# Patient Record
Sex: MALE | Race: BLACK OR AFRICAN AMERICAN | NOT HISPANIC OR LATINO | URBAN - METROPOLITAN AREA
[De-identification: names, ages, dates, MRNs, and addresses within clinical notes are randomized per-mention and may not be internally consistent; named-entity substitution may affect disease eponyms.]

---

## 2017-01-13 ENCOUNTER — EMERGENCY (EMERGENCY)
Facility: HOSPITAL | Age: 52
LOS: 1 days | Discharge: HOME | End: 2017-01-13
Attending: EMERGENCY MEDICINE | Admitting: EMERGENCY MEDICINE
Payer: MEDICAID

## 2017-01-13 VITALS
TEMPERATURE: 98 F | RESPIRATION RATE: 17 BRPM | HEART RATE: 76 BPM | OXYGEN SATURATION: 98 % | DIASTOLIC BLOOD PRESSURE: 79 MMHG | SYSTOLIC BLOOD PRESSURE: 153 MMHG

## 2017-01-13 VITALS
TEMPERATURE: 98 F | SYSTOLIC BLOOD PRESSURE: 163 MMHG | HEART RATE: 89 BPM | OXYGEN SATURATION: 96 % | RESPIRATION RATE: 18 BRPM | DIASTOLIC BLOOD PRESSURE: 83 MMHG
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
## 2017-01-13 DIAGNOSIS — S86.812A STRAIN OF OTHER MUSCLE(S) AND TENDON(S) AT LOWER LEG LEVEL, LEFT LEG, INITIAL ENCOUNTER: ICD-10-CM
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
## 2017-01-13 DIAGNOSIS — M79.662 PAIN IN LEFT LOWER LEG: ICD-10-CM

## 2017-01-13 PROCEDURE — 29515 APPLICATION SHORT LEG SPLINT: CPT | Mod: LT

## 2017-01-13 PROCEDURE — 99283 EMERGENCY DEPT VISIT LOW MDM: CPT | Mod: 25

## 2017-01-13 NOTE — ED PROCEDURE NOTE - CPROC ED POST PROC CARE GUIDE1
Keep the cast/splint/dressing clean and dry./Verbal/written post procedure instructions were given to patient/caregiver./Instructed patient/caregiver regarding signs and symptoms of infection./Elevate the injured extremity as instructed./Instructed patient/caregiver to follow-up with primary care physician.

## 2017-01-13 NOTE — ED ADULT TRIAGE NOTE - CHIEF COMPLAINT QUOTE
c/o left calf pain. Pt reports was holding a large cage and while preventing it from falling, left foot got caught and fell back quotes "it bent too far. Feels like a permanent charleyhorse." Noted stiffens to left calf.

## 2017-01-13 NOTE — ED PROVIDER NOTE - OBJECTIVE STATEMENT
pt to ed co pain and swelling after fall at work with severe pain to calf to lower left leg no dizzy no NVD no cough no SOB no fever no chills no deformity unable to weight bear

## 2017-01-13 NOTE — ED PROVIDER NOTE - MUSCULOSKELETAL, MLM
Spine appears normal, range of motion is not limited, tender with small deficit to calf achilles intact NVI FROM

## 2017-01-18 ENCOUNTER — APPOINTMENT (OUTPATIENT)
Dept: ORTHOPEDIC SURGERY | Facility: CLINIC | Age: 52
End: 2017-01-18

## 2017-01-18 DIAGNOSIS — F17.200 NICOTINE DEPENDENCE, UNSPECIFIED, UNCOMPLICATED: ICD-10-CM

## 2017-02-01 ENCOUNTER — APPOINTMENT (OUTPATIENT)
Dept: ORTHOPEDIC SURGERY | Facility: CLINIC | Age: 52
End: 2017-02-01

## 2017-02-01 DIAGNOSIS — M10.472 OTHER SECONDARY GOUT, LEFT ANKLE AND FOOT: ICD-10-CM

## 2017-02-06 ENCOUNTER — OTHER (OUTPATIENT)
Age: 52
End: 2017-02-06

## 2017-02-15 ENCOUNTER — APPOINTMENT (OUTPATIENT)
Dept: ORTHOPEDIC SURGERY | Facility: CLINIC | Age: 52
End: 2017-02-15

## 2017-02-15 RX ORDER — INDOMETHACIN 25 MG/1
25 CAPSULE ORAL 3 TIMES DAILY
Qty: 20 | Refills: 0 | Status: COMPLETED | COMMUNITY
Start: 2017-01-18 | End: 2017-02-01

## 2017-03-15 ENCOUNTER — APPOINTMENT (OUTPATIENT)
Dept: ORTHOPEDIC SURGERY | Facility: CLINIC | Age: 52
End: 2017-03-15

## 2022-11-07 ENCOUNTER — INPATIENT (INPATIENT)
Facility: HOSPITAL | Age: 57
LOS: 1 days | Discharge: HOME | DRG: 247 | End: 2022-11-09
Attending: INTERNAL MEDICINE | Admitting: INTERNAL MEDICINE
Payer: COMMERCIAL

## 2022-11-07 VITALS
RESPIRATION RATE: 18 BRPM | OXYGEN SATURATION: 94 % | SYSTOLIC BLOOD PRESSURE: 142 MMHG | HEART RATE: 110 BPM | TEMPERATURE: 98 F | DIASTOLIC BLOOD PRESSURE: 84 MMHG | WEIGHT: 245.82 LBS

## 2022-11-07 DIAGNOSIS — I10 ESSENTIAL (PRIMARY) HYPERTENSION: ICD-10-CM

## 2022-11-07 DIAGNOSIS — E11.9 TYPE 2 DIABETES MELLITUS WITHOUT COMPLICATIONS: ICD-10-CM

## 2022-11-07 DIAGNOSIS — I25.10 ATHEROSCLEROTIC HEART DISEASE OF NATIVE CORONARY ARTERY WITHOUT ANGINA PECTORIS: ICD-10-CM

## 2022-11-07 LAB
ALBUMIN SERPL ELPH-MCNC: 4.8 G/DL — SIGNIFICANT CHANGE UP (ref 3.3–5)
ALBUMIN SERPL ELPH-MCNC: 4.8 G/DL — SIGNIFICANT CHANGE UP (ref 3.3–5)
ALP SERPL-CCNC: 137 U/L — HIGH (ref 40–120)
ALP SERPL-CCNC: SIGNIFICANT CHANGE UP (ref 40–120)
ALT FLD-CCNC: 34 U/L — SIGNIFICANT CHANGE UP (ref 10–45)
ALT FLD-CCNC: SIGNIFICANT CHANGE UP (ref 10–45)
ANION GAP SERPL CALC-SCNC: 14 MMOL/L — SIGNIFICANT CHANGE UP (ref 5–17)
ANION GAP SERPL CALC-SCNC: 15 MMOL/L — SIGNIFICANT CHANGE UP (ref 5–17)
AST SERPL-CCNC: 42 U/L — HIGH (ref 10–40)
AST SERPL-CCNC: SIGNIFICANT CHANGE UP (ref 10–40)
BASE EXCESS BLDV CALC-SCNC: 2.7 MMOL/L — SIGNIFICANT CHANGE UP (ref -2–3)
BASOPHILS # BLD AUTO: 0.05 K/UL — SIGNIFICANT CHANGE UP (ref 0–0.2)
BASOPHILS NFR BLD AUTO: 0.9 % — SIGNIFICANT CHANGE UP (ref 0–2)
BILIRUB SERPL-MCNC: 0.3 MG/DL — SIGNIFICANT CHANGE UP (ref 0.2–1.2)
BILIRUB SERPL-MCNC: 0.4 MG/DL — SIGNIFICANT CHANGE UP (ref 0.2–1.2)
BUN SERPL-MCNC: 16 MG/DL — SIGNIFICANT CHANGE UP (ref 7–23)
BUN SERPL-MCNC: 17 MG/DL — SIGNIFICANT CHANGE UP (ref 7–23)
CALCIUM SERPL-MCNC: 10.4 MG/DL — SIGNIFICANT CHANGE UP (ref 8.4–10.5)
CALCIUM SERPL-MCNC: 10.5 MG/DL — SIGNIFICANT CHANGE UP (ref 8.4–10.5)
CHLORIDE SERPL-SCNC: 96 MMOL/L — SIGNIFICANT CHANGE UP (ref 96–108)
CHLORIDE SERPL-SCNC: 97 MMOL/L — SIGNIFICANT CHANGE UP (ref 96–108)
CK MB CFR SERPL CALC: 3 NG/ML — SIGNIFICANT CHANGE UP (ref 0–6.7)
CK SERPL-CCNC: SIGNIFICANT CHANGE UP (ref 30–200)
CO2 BLDV-SCNC: 29.3 MMOL/L — HIGH (ref 22–26)
CO2 SERPL-SCNC: 25 MMOL/L — SIGNIFICANT CHANGE UP (ref 22–31)
CO2 SERPL-SCNC: 27 MMOL/L — SIGNIFICANT CHANGE UP (ref 22–31)
CREAT SERPL-MCNC: 0.99 MG/DL — SIGNIFICANT CHANGE UP (ref 0.5–1.3)
CREAT SERPL-MCNC: 1.04 MG/DL — SIGNIFICANT CHANGE UP (ref 0.5–1.3)
DACRYOCYTES BLD QL SMEAR: SLIGHT — SIGNIFICANT CHANGE UP
EGFR: 85 ML/MIN/1.73M2 — SIGNIFICANT CHANGE UP
EGFR: 90 ML/MIN/1.73M2 — SIGNIFICANT CHANGE UP
EOSINOPHIL # BLD AUTO: 0.15 K/UL — SIGNIFICANT CHANGE UP (ref 0–0.5)
EOSINOPHIL NFR BLD AUTO: 2.8 % — SIGNIFICANT CHANGE UP (ref 0–6)
GAS PNL BLDV: SIGNIFICANT CHANGE UP
GLUCOSE SERPL-MCNC: 151 MG/DL — HIGH (ref 70–99)
GLUCOSE SERPL-MCNC: 166 MG/DL — HIGH (ref 70–99)
HCO3 BLDV-SCNC: 28 MMOL/L — SIGNIFICANT CHANGE UP (ref 22–29)
HCT VFR BLD CALC: 44.3 % — SIGNIFICANT CHANGE UP (ref 39–50)
HGB BLD-MCNC: 15.3 G/DL — SIGNIFICANT CHANGE UP (ref 13–17)
LACTATE SERPL-SCNC: 2.6 MMOL/L — HIGH (ref 0.5–2)
LACTATE SERPL-SCNC: 2.8 MMOL/L — HIGH (ref 0.5–2)
LYMPHOCYTES # BLD AUTO: 1.34 K/UL — SIGNIFICANT CHANGE UP (ref 1–3.3)
LYMPHOCYTES # BLD AUTO: 25.2 % — SIGNIFICANT CHANGE UP (ref 13–44)
MAGNESIUM SERPL-MCNC: 1.8 MG/DL — SIGNIFICANT CHANGE UP (ref 1.6–2.6)
MANUAL SMEAR VERIFICATION: SIGNIFICANT CHANGE UP
MCHC RBC-ENTMCNC: 29.1 PG — SIGNIFICANT CHANGE UP (ref 27–34)
MCHC RBC-ENTMCNC: 34.5 GM/DL — SIGNIFICANT CHANGE UP (ref 32–36)
MCV RBC AUTO: 84.2 FL — SIGNIFICANT CHANGE UP (ref 80–100)
MONOCYTES # BLD AUTO: 0.3 K/UL — SIGNIFICANT CHANGE UP (ref 0–0.9)
MONOCYTES NFR BLD AUTO: 5.6 % — SIGNIFICANT CHANGE UP (ref 2–14)
NEUTROPHILS # BLD AUTO: 3.27 K/UL — SIGNIFICANT CHANGE UP (ref 1.8–7.4)
NEUTROPHILS NFR BLD AUTO: 61.7 % — SIGNIFICANT CHANGE UP (ref 43–77)
NT-PROBNP SERPL-SCNC: 7 PG/ML — SIGNIFICANT CHANGE UP (ref 0–300)
PCO2 BLDV: 44 MMHG — SIGNIFICANT CHANGE UP (ref 42–55)
PH BLDV: 7.41 — SIGNIFICANT CHANGE UP (ref 7.32–7.43)
PLAT MORPH BLD: NORMAL — SIGNIFICANT CHANGE UP
PLATELET # BLD AUTO: 252 K/UL — SIGNIFICANT CHANGE UP (ref 150–400)
PO2 BLDV: 50 MMHG — HIGH (ref 25–45)
POIKILOCYTOSIS BLD QL AUTO: SLIGHT — SIGNIFICANT CHANGE UP
POLYCHROMASIA BLD QL SMEAR: SLIGHT — SIGNIFICANT CHANGE UP
POTASSIUM SERPL-MCNC: 4.3 MMOL/L — SIGNIFICANT CHANGE UP (ref 3.5–5.3)
POTASSIUM SERPL-MCNC: SIGNIFICANT CHANGE UP (ref 3.5–5.3)
POTASSIUM SERPL-SCNC: 4.3 MMOL/L — SIGNIFICANT CHANGE UP (ref 3.5–5.3)
POTASSIUM SERPL-SCNC: SIGNIFICANT CHANGE UP (ref 3.5–5.3)
PROT SERPL-MCNC: 8.4 G/DL — HIGH (ref 6–8.3)
PROT SERPL-MCNC: 8.4 G/DL — HIGH (ref 6–8.3)
RBC # BLD: 5.26 M/UL — SIGNIFICANT CHANGE UP (ref 4.2–5.8)
RBC # FLD: 14.1 % — SIGNIFICANT CHANGE UP (ref 10.3–14.5)
RBC BLD AUTO: ABNORMAL
SAO2 % BLDV: SIGNIFICANT CHANGE UP % (ref 67–88)
SARS-COV-2 RNA SPEC QL NAA+PROBE: NEGATIVE — SIGNIFICANT CHANGE UP
SMUDGE CELLS # BLD: PRESENT — SIGNIFICANT CHANGE UP
SODIUM SERPL-SCNC: 137 MMOL/L — SIGNIFICANT CHANGE UP (ref 135–145)
SODIUM SERPL-SCNC: 137 MMOL/L — SIGNIFICANT CHANGE UP (ref 135–145)
TROPONIN T SERPL-MCNC: 0.01 NG/ML — SIGNIFICANT CHANGE UP (ref 0–0.01)
TROPONIN T SERPL-MCNC: 0.01 NG/ML — SIGNIFICANT CHANGE UP (ref 0–0.01)
VARIANT LYMPHS # BLD: 3.8 % — SIGNIFICANT CHANGE UP (ref 0–6)
WBC # BLD: 5.3 K/UL — SIGNIFICANT CHANGE UP (ref 3.8–10.5)
WBC # FLD AUTO: 5.3 K/UL — SIGNIFICANT CHANGE UP (ref 3.8–10.5)

## 2022-11-07 PROCEDURE — G1004: CPT

## 2022-11-07 PROCEDURE — 99285 EMERGENCY DEPT VISIT HI MDM: CPT

## 2022-11-07 PROCEDURE — 75574 CT ANGIO HRT W/3D IMAGE: CPT | Mod: 26,ME

## 2022-11-07 RX ORDER — CLOPIDOGREL BISULFATE 75 MG/1
75 TABLET, FILM COATED ORAL DAILY
Refills: 0 | Status: DISCONTINUED | OUTPATIENT
Start: 2022-11-08 | End: 2022-11-09

## 2022-11-07 RX ORDER — LISINOPRIL 2.5 MG/1
20 TABLET ORAL DAILY
Refills: 0 | Status: DISCONTINUED | OUTPATIENT
Start: 2022-11-08 | End: 2022-11-09

## 2022-11-07 RX ORDER — DEXTROSE 50 % IN WATER 50 %
12.5 SYRINGE (ML) INTRAVENOUS ONCE
Refills: 0 | Status: DISCONTINUED | OUTPATIENT
Start: 2022-11-07 | End: 2022-11-09

## 2022-11-07 RX ORDER — SODIUM CHLORIDE 9 MG/ML
1000 INJECTION, SOLUTION INTRAVENOUS
Refills: 0 | Status: DISCONTINUED | OUTPATIENT
Start: 2022-11-07 | End: 2022-11-09

## 2022-11-07 RX ORDER — ASPIRIN/CALCIUM CARB/MAGNESIUM 324 MG
81 TABLET ORAL DAILY
Refills: 0 | Status: DISCONTINUED | OUTPATIENT
Start: 2022-11-08 | End: 2022-11-09

## 2022-11-07 RX ORDER — MAGNESIUM SULFATE 500 MG/ML
1 VIAL (ML) INJECTION ONCE
Refills: 0 | Status: COMPLETED | OUTPATIENT
Start: 2022-11-07 | End: 2022-11-07

## 2022-11-07 RX ORDER — GLUCAGON INJECTION, SOLUTION 0.5 MG/.1ML
1 INJECTION, SOLUTION SUBCUTANEOUS ONCE
Refills: 0 | Status: DISCONTINUED | OUTPATIENT
Start: 2022-11-07 | End: 2022-11-09

## 2022-11-07 RX ORDER — DEXTROSE 50 % IN WATER 50 %
15 SYRINGE (ML) INTRAVENOUS ONCE
Refills: 0 | Status: DISCONTINUED | OUTPATIENT
Start: 2022-11-07 | End: 2022-11-09

## 2022-11-07 RX ORDER — CLOPIDOGREL BISULFATE 75 MG/1
600 TABLET, FILM COATED ORAL ONCE
Refills: 0 | Status: COMPLETED | OUTPATIENT
Start: 2022-11-07 | End: 2022-11-07

## 2022-11-07 RX ORDER — SODIUM CHLORIDE 9 MG/ML
1000 INJECTION INTRAMUSCULAR; INTRAVENOUS; SUBCUTANEOUS
Refills: 0 | Status: COMPLETED | OUTPATIENT
Start: 2022-11-07 | End: 2022-11-08

## 2022-11-07 RX ORDER — METOPROLOL TARTRATE 50 MG
100 TABLET ORAL ONCE
Refills: 0 | Status: COMPLETED | OUTPATIENT
Start: 2022-11-07 | End: 2022-11-07

## 2022-11-07 RX ORDER — ALLOPURINOL 300 MG
200 TABLET ORAL DAILY
Refills: 0 | Status: DISCONTINUED | OUTPATIENT
Start: 2022-11-08 | End: 2022-11-09

## 2022-11-07 RX ORDER — DEXTROSE 50 % IN WATER 50 %
25 SYRINGE (ML) INTRAVENOUS ONCE
Refills: 0 | Status: DISCONTINUED | OUTPATIENT
Start: 2022-11-07 | End: 2022-11-09

## 2022-11-07 RX ORDER — ASPIRIN/CALCIUM CARB/MAGNESIUM 324 MG
162 TABLET ORAL ONCE
Refills: 0 | Status: COMPLETED | OUTPATIENT
Start: 2022-11-07 | End: 2022-11-07

## 2022-11-07 RX ORDER — INSULIN LISPRO 100/ML
VIAL (ML) SUBCUTANEOUS
Refills: 0 | Status: DISCONTINUED | OUTPATIENT
Start: 2022-11-07 | End: 2022-11-09

## 2022-11-07 RX ADMIN — CLOPIDOGREL BISULFATE 600 MILLIGRAM(S): 75 TABLET, FILM COATED ORAL at 20:49

## 2022-11-07 RX ADMIN — SODIUM CHLORIDE 75 MILLILITER(S): 9 INJECTION INTRAMUSCULAR; INTRAVENOUS; SUBCUTANEOUS at 23:59

## 2022-11-07 RX ADMIN — Medication 162 MILLIGRAM(S): at 20:49

## 2022-11-07 RX ADMIN — Medication 100 GRAM(S): at 20:49

## 2022-11-07 RX ADMIN — Medication 100 MILLIGRAM(S): at 16:07

## 2022-11-07 RX ADMIN — Medication 162 MILLIGRAM(S): at 15:10

## 2022-11-07 NOTE — H&P ADULT - NSHPPHYSICALEXAM_GEN_ALL_CORE
Vital Signs Last 24 Hrs  afebrile   HR: 99 (07 Nov 2022 15:42) (99 - 110)  BP: 144/83 (07 Nov 2022 15:42) (142/84 - 144/83)  RR: 18 (07 Nov 2022 15:42) (18 - 18)  SpO2: 95%  room air      Neck: no JVD  Chest: CTA b/l  Cor: S1 S2 RRR, no murmurs  Abd: obese, soft, NT/ND  Ext: no edema b/l  VAsc: radial 2 + b/l , DP/PT 2+ b/l   Neuro: A+O x 3, non focal  Psych: anxious, cooperative, appropriate affect

## 2022-11-07 NOTE — ED PROVIDER NOTE - CADM POA URETHRAL CATHETER
What Type Of Note Output Would You Prefer (Optional)?: Standard Output How Severe Is Your Skin Lesion?: mild Has Your Skin Lesion Been Treated?: not been treated Is This A New Presentation, Or A Follow-Up?: Skin Lesion No

## 2022-11-07 NOTE — ED ADULT NURSE NOTE - OBJECTIVE STATEMENT
57y M, PMHx HTN, DM, presents to the ED c/o left neck pain radiating to left arm and numbness/tingling to left arm x1 month. Pt states, "I have had pain and episodes of tingling to my left arm which comes and goes and last 30seconds ." Hand  strength equal bilaterally. Denies SOB, fever, chills, NVD, lightheadedness, dizziness. Pt A&Ox4, ambulatory with steady gait, speaking in clear/full sentences, vital signs stable.

## 2022-11-07 NOTE — ED PROVIDER NOTE - OBJECTIVE STATEMENT
56 y/o Male with a PMHx of obesity, HTN HLD, Type 2 DM, Hypertriglyceridemia presenting to the ED c/o 1 month of intermittent left sided chest numbness with radiation to L-arm. Not associated with exertion. Describes as numbness and tingling, but denies CP, SOB, Hx of stents in past, dizziness, and fatigue. Noticed he was diaphoretic w/o exertion. No fever or chills . Additionally reports L-sided neck pain and feels like neck muscles are spastic. Denies trauma, gait difficulty, HA visual disturbance and weakness in eyes.

## 2022-11-07 NOTE — H&P ADULT - REASON FOR ADMISSION
L arm tingling and L sided Chest discomfort x1 month L arm numbness and tingling and L sided Chest discomfort x1 month

## 2022-11-07 NOTE — H&P ADULT - NSHPLABSRESULTS_GEN_ALL_CORE
CARDIAC MARKERS ( 07 Nov 2022 15:17 )  x     / 0.01 ng/mL / See Note / x     / 3.0 ng/mL    11-07    137  |  96  |  17  ----------------------------<  151<H>  4.3   |  27  |  1.04    Ca    10.5      07 Nov 2022 16:19  Mg     1.8     11-07    TPro  8.4<H>  /  Alb  4.8  /  TBili  0.3  /  DBili  x   /  AST  42<H>  /  ALT  34  /  AlkPhos  137<H>  11-07                          15.3   5.30  )-----------( 252      ( 07 Nov 2022 15:17 )             44.3

## 2022-11-07 NOTE — ED PROVIDER NOTE - PHYSICAL EXAMINATION
VITAL SIGNS: I have reviewed nursing notes and confirm.  CONSTITUTIONAL: Well appearing, in no acute distress.   SKIN:  warm and dry, no acute rash.   HEAD:  normocephalic, atraumatic.  EYES: EOM intact; conjunctiva and sclera clear.  ENT: No nasal discharge; airway clear.   NECK: Supple; non tender. Has L-cervical paraspinal tenderness. No midline tenderness.  CARD: S1, S2 normal; no murmurs, gallops, or rubs. Regular rate and rhythm.   RESP:  Clear to auscultation b/l, no wheezes, rales or rhonchi.  ABD: Normal bowel sounds; soft; non-distended; non-tender; no guarding/ rebound.  EXT: Normal ROM. No clubbing, cyanosis or edema. 2+ pulses to b/l ue/le.  NEURO: Alert, oriented, grossly unremarkable  PSYCH: Cooperative, mood and affect appropriate.

## 2022-11-07 NOTE — H&P ADULT - NSICDXPASTMEDICALHX_GEN_ALL_CORE_FT
PAST MEDICAL HISTORY:  DM (diabetes mellitus), type 2     HLD (hyperlipidemia)     HTN (hypertension)     Hypertriglyceridemia     No pertinent past medical history     Obesity

## 2022-11-07 NOTE — H&P ADULT - NSHPREVIEWOFSYSTEMS_GEN_ALL_CORE
as above plus  ROS + for  L-sided neck pain and feels like neck muscles are spastic. Denies trauma, gait difficulty, HA visual disturbance and weakness in eyes.  had colonoscopy with EGD about 5 years ago - normal as per pt

## 2022-11-07 NOTE — ED PROVIDER NOTE - NSICDXPASTMEDICALHX_GEN_ALL_CORE_FT
PAST MEDICAL HISTORY:  No pertinent past medical history       DM (diabetes mellitus), type 2     HLD (hyperlipidemia)     HTN (hypertension)     Hypertriglyceridemia     Obesity

## 2022-11-07 NOTE — H&P ADULT - HISTORY OF PRESENT ILLNESS
56 y/o Male with a PMHx of obesity, HTN HLD, Type 2 DM, Hypertriglyceridemia, FMH for CAD ( Mother with PCI in her 70's),  who presented to the [facility] ED c/o 1 month of intermittent left sided chest numbness /twitching   with radiation to L-arm (numbness). Not associated with exertion. Describes as numbness and tingling, but denies CP, SOB, BLAKE, dizziness, palpitations, syncope, LE edema, PND. Denies any cardiac w/up. Never had similar symptoms in the past.  56 y/o Male with a PMHx of obesity, HTN HLD, Type 2 DM, Hypertriglyceridemia, FMH for CAD ( Mother with PCI in her 70's),  who presented to the [facility] ED c/o 1 month of intermittent left sided chest numbness /twitching   with radiation to L-arm (numbness). Not associated with exertion. Describes as numbness and tingling sensation that gets worse with L arm movement not with exertion and occurs intermittently at rest or movement. Denies CP, SOB, BLAKE, dizziness, palpitations, syncope, LE edema, PND. Denies any cardiac w/up. Never had similar symptoms in the past.     In ED CP free, VSS, troponin neg x 1, EKG SR non ischemic  CCTA done in ED 11/07:    56 y/o Male with a PMHx of obesity, HTN HLD, Type 2 DM, Hypertriglyceridemia, FMH for CAD ( Mother with PCI in her 70's),  who presented to the [facility] ED c/o 1 month of intermittent left sided chest numbness /twitching   with radiation to L-arm (numbness). Not associated with exertion. Describes as numbness and tingling sensation that gets worse with L arm movement not with exertion and occurs intermittently at rest or movement. Denies CP, SOB, BLAKE, dizziness, palpitations, syncope, LE edema, PND. Denies any cardiac w/up. Never had similar symptoms in the past.     In ED CP free, VSS, troponin neg x 1, EKG SR non ischemic, given 162 mg asa    CCTA done in ED 11/07:   1.  The calcium score is severe at 558 Agatston units, which is at the 96   percentile, adjusted for age, gender and race.  2.  Mid RCA has severe stenosis.  3.  Remaining segments are non obstructive    Patient is now being admitted for a cardiac cath in am

## 2022-11-07 NOTE — H&P ADULT - PROBLEM SELECTOR PLAN 1
consented for cath in am given evidence of mRCA stenosis on CCTA  plavix load 600mg ordered on admit, followed by 75mg daily in am   asa 162 mg given in ED, continue asa 81mg daily  HD stable and asymptomatic on admit, troponin neg x 1    IVF ordered as per protocol at 75 cc/h x 12 hrs  s/p dye exposure today from CCTA, Cr at baseline normal   EF unknown, TTE ordered, euvolemic on  exam\    F/UP troponin tonight, start heparin if uptrending consented for cath in am given evidence of mRCA stenosis on CCTA  plavix load 600mg ordered on admit, followed by 75mg daily in am   asa 162 mg given in ED, another 162mg asa on admit for total load 324mg  continue asa 81mg daily  starting 11/8  HD stable and asymptomatic on admit, troponin neg x 1    IVF ordered as per protocol at 75 cc/h x 12 hrs  s/p dye exposure today from CCTA, Cr at baseline normal   EF unknown, TTE ordered, euvolemic on  exam\    F/UP troponin tonight, start heparin if uptrending

## 2022-11-07 NOTE — H&P ADULT - ASSESSMENT
58 y/o Male with a PMHx of obesity, HTN HLD, Type 2 DM, Hypertriglyceridemia, FMH for CAD ( Mother with PCI in her 70's),  who presented to the [facility] ED c/o 1 month of intermittent left sided chest numbness /twitching   with radiation to L-arm (numbness), CCTA  done in ED + for severe RCA stenosis. 56 y/o Male, smoker, with a PMHx of obesity, HTN HLD, Type 2 DM, Hypertriglyceridemia, gout,  FMH for CAD ( Mother with PCI in her 70's),  who presented to the [facility] ED c/o 1 month of intermittent left sided chest numbness /twitching   with radiation to L-arm (numbness), CCTA  done in ED + for severe mRCA stenosis.

## 2022-11-07 NOTE — H&P ADULT - PROBLEM SELECTOR PLAN 2
stable  continue home lisinopril 20mg daily  also takes HCTZ - dose unknown  patient does not remember all his meds, pharmacy closed ( [facility])  need to call pharmacy in am, patient was also asked to get med list from home

## 2022-11-07 NOTE — ED PROVIDER NOTE - CLINICAL SUMMARY MEDICAL DECISION MAKING FREE TEXT BOX
Possible cervical radiculopathy given neck pain. However, pt has significant risk factors for ACS. Will obtain cardiac work up.

## 2022-11-08 ENCOUNTER — TRANSCRIPTION ENCOUNTER (OUTPATIENT)
Age: 57
End: 2022-11-08

## 2022-11-08 DIAGNOSIS — Z29.9 ENCOUNTER FOR PROPHYLACTIC MEASURES, UNSPECIFIED: ICD-10-CM

## 2022-11-08 LAB
A1C WITH ESTIMATED AVERAGE GLUCOSE RESULT: 7 % — HIGH (ref 4–5.6)
CHOLEST SERPL-MCNC: 192 MG/DL — SIGNIFICANT CHANGE UP
ESTIMATED AVERAGE GLUCOSE: 154 MG/DL — HIGH (ref 68–114)
GLUCOSE BLDC GLUCOMTR-MCNC: 103 MG/DL — HIGH (ref 70–99)
GLUCOSE BLDC GLUCOMTR-MCNC: 167 MG/DL — HIGH (ref 70–99)
GLUCOSE BLDC GLUCOMTR-MCNC: 192 MG/DL — HIGH (ref 70–99)
GLUCOSE BLDC GLUCOMTR-MCNC: 213 MG/DL — HIGH (ref 70–99)
GLUCOSE BLDC GLUCOMTR-MCNC: 233 MG/DL — HIGH (ref 70–99)
HDLC SERPL-MCNC: 22 MG/DL — LOW
ISTAT ACTK (ACTIVATED CLOTTING TIME KAOLIN): 347 SEC — HIGH (ref 74–137)
LIDOCAIN IGE QN: 23 U/L — SIGNIFICANT CHANGE UP (ref 7–60)
LIPID PNL WITH DIRECT LDL SERPL: SIGNIFICANT CHANGE UP MG/DL
NON HDL CHOLESTEROL: 170 MG/DL — HIGH
TRIGL SERPL-MCNC: 1059 MG/DL — HIGH

## 2022-11-08 PROCEDURE — 93458 L HRT ARTERY/VENTRICLE ANGIO: CPT | Mod: 26,59

## 2022-11-08 PROCEDURE — 93306 TTE W/DOPPLER COMPLETE: CPT | Mod: 26

## 2022-11-08 PROCEDURE — 99222 1ST HOSP IP/OBS MODERATE 55: CPT

## 2022-11-08 PROCEDURE — 99152 MOD SED SAME PHYS/QHP 5/>YRS: CPT

## 2022-11-08 PROCEDURE — 93010 ELECTROCARDIOGRAM REPORT: CPT

## 2022-11-08 PROCEDURE — 70450 CT HEAD/BRAIN W/O DYE: CPT | Mod: 26

## 2022-11-08 PROCEDURE — 92933 PRQ TRLML C ATHRC ST ANGIOP1: CPT | Mod: RC

## 2022-11-08 RX ORDER — HYDROCHLOROTHIAZIDE 25 MG
12.5 TABLET ORAL DAILY
Refills: 0 | Status: DISCONTINUED | OUTPATIENT
Start: 2022-11-08 | End: 2022-11-09

## 2022-11-08 RX ORDER — ATORVASTATIN CALCIUM 80 MG/1
40 TABLET, FILM COATED ORAL AT BEDTIME
Refills: 0 | Status: DISCONTINUED | OUTPATIENT
Start: 2022-11-08 | End: 2022-11-09

## 2022-11-08 RX ORDER — OXYCODONE HYDROCHLORIDE 5 MG/1
5 TABLET ORAL ONCE
Refills: 0 | Status: DISCONTINUED | OUTPATIENT
Start: 2022-11-08 | End: 2022-11-08

## 2022-11-08 RX ORDER — FENOFIBRATE,MICRONIZED 130 MG
145 CAPSULE ORAL AT BEDTIME
Refills: 0 | Status: DISCONTINUED | OUTPATIENT
Start: 2022-11-08 | End: 2022-11-09

## 2022-11-08 RX ORDER — SODIUM CHLORIDE 9 MG/ML
500 INJECTION INTRAMUSCULAR; INTRAVENOUS; SUBCUTANEOUS
Refills: 0 | Status: DISCONTINUED | OUTPATIENT
Start: 2022-11-08 | End: 2022-11-09

## 2022-11-08 RX ORDER — ACETAMINOPHEN 500 MG
650 TABLET ORAL ONCE
Refills: 0 | Status: COMPLETED | OUTPATIENT
Start: 2022-11-08 | End: 2022-11-08

## 2022-11-08 RX ORDER — DAPAGLIFLOZIN 10 MG/1
10 TABLET, FILM COATED ORAL EVERY 24 HOURS
Refills: 0 | Status: DISCONTINUED | OUTPATIENT
Start: 2022-11-09 | End: 2022-11-09

## 2022-11-08 RX ADMIN — Medication 200 MILLIGRAM(S): at 12:32

## 2022-11-08 RX ADMIN — Medication 4: at 12:32

## 2022-11-08 RX ADMIN — SODIUM CHLORIDE 250 MILLILITER(S): 9 INJECTION INTRAMUSCULAR; INTRAVENOUS; SUBCUTANEOUS at 18:03

## 2022-11-08 RX ADMIN — ATORVASTATIN CALCIUM 40 MILLIGRAM(S): 80 TABLET, FILM COATED ORAL at 21:13

## 2022-11-08 RX ADMIN — Medication 650 MILLIGRAM(S): at 18:03

## 2022-11-08 RX ADMIN — OXYCODONE HYDROCHLORIDE 5 MILLIGRAM(S): 5 TABLET ORAL at 20:01

## 2022-11-08 RX ADMIN — Medication 2: at 06:45

## 2022-11-08 RX ADMIN — OXYCODONE HYDROCHLORIDE 5 MILLIGRAM(S): 5 TABLET ORAL at 21:09

## 2022-11-08 RX ADMIN — Medication 2: at 21:38

## 2022-11-08 RX ADMIN — Medication 4: at 00:38

## 2022-11-08 RX ADMIN — Medication 12.5 MILLIGRAM(S): at 18:47

## 2022-11-08 RX ADMIN — LISINOPRIL 20 MILLIGRAM(S): 2.5 TABLET ORAL at 05:59

## 2022-11-08 RX ADMIN — Medication 145 MILLIGRAM(S): at 21:12

## 2022-11-08 RX ADMIN — Medication 81 MILLIGRAM(S): at 12:31

## 2022-11-08 RX ADMIN — Medication 650 MILLIGRAM(S): at 18:47

## 2022-11-08 NOTE — PROGRESS NOTE ADULT - SUBJECTIVE AND OBJECTIVE BOX
O/N Events: Patient admitted overnight.     Subjective/ROS: Patient seen and examined at bedside.     Denies Fever/Chills, HA, CP, SOB, n/v, changes in bowel/urinary habits.  12pt ROS otherwise negative.    VITALS  Vital Signs Last 24 Hrs  T(C): 36.7 (08 Nov 2022 11:09), Max: 36.7 (08 Nov 2022 11:09)  T(F): 98 (08 Nov 2022 11:09), Max: 98 (08 Nov 2022 11:09)  HR: 62 (08 Nov 2022 11:46) (61 - 99)  BP: 137/77 (08 Nov 2022 11:46) (106/77 - 144/83)  BP(mean): 103 (08 Nov 2022 11:46) (82 - 103)  RR: 16 (08 Nov 2022 11:46) (16 - 18)  SpO2: 93% (08 Nov 2022 11:46) (93% - 97%)    Parameters below as of 08 Nov 2022 11:46  Patient On (Oxygen Delivery Method): room air        CAPILLARY BLOOD GLUCOSE      POCT Blood Glucose.: 233 mg/dL (08 Nov 2022 12:22)  POCT Blood Glucose.: 167 mg/dL (08 Nov 2022 06:43)  POCT Blood Glucose.: 213 mg/dL (08 Nov 2022 00:11)      PHYSICAL EXAM  General: NAD  Head: NC/AT; MMM; PERRL; EOMI;  Neck: Supple; no JVD  Respiratory: CTAB; no wheezes/rales/rhonchi  Cardiovascular: Regular rhythm/rate; S1/S2+, no murmurs, rubs gallops   Gastrointestinal: Soft; NTND; bowel sounds normal and present  Extremities: WWP; no edema/cyanosis  Neurological: A&Ox3, CNII-XII grossly intact; no obvious focal deficits    MEDICATIONS  (STANDING):  allopurinol 200 milliGRAM(s) Oral daily  aspirin enteric coated 81 milliGRAM(s) Oral daily  clopidogrel Tablet 75 milliGRAM(s) Oral daily  dextrose 5%. 1000 milliLiter(s) (50 mL/Hr) IV Continuous <Continuous>  dextrose 5%. 1000 milliLiter(s) (100 mL/Hr) IV Continuous <Continuous>  dextrose 50% Injectable 25 Gram(s) IV Push once  dextrose 50% Injectable 12.5 Gram(s) IV Push once  dextrose 50% Injectable 25 Gram(s) IV Push once  glucagon  Injectable 1 milliGRAM(s) IntraMuscular once  insulin lispro (ADMELOG) corrective regimen sliding scale   SubCutaneous Before meals and at bedtime  lisinopril 20 milliGRAM(s) Oral daily  sodium chloride 0.9%. 1000 milliLiter(s) (75 mL/Hr) IV Continuous <Continuous>    MEDICATIONS  (PRN):  dextrose Oral Gel 15 Gram(s) Oral once PRN Blood Glucose LESS THAN 70 milliGRAM(s)/deciliter      No Known Allergies      LABS                        15.3   5.30  )-----------( 252      ( 07 Nov 2022 15:17 )             44.3     11-07    137  |  96  |  17  ----------------------------<  151<H>  4.3   |  27  |  1.04    Ca    10.5      07 Nov 2022 16:19  Mg     1.8     11-07    TPro  8.4<H>  /  Alb  4.8  /  TBili  0.3  /  DBili  x   /  AST  42<H>  /  ALT  34  /  AlkPhos  137<H>  11-07        CARDIAC MARKERS ( 07 Nov 2022 23:05 )  x     / 0.01 ng/mL / x     / x     / x      CARDIAC MARKERS ( 07 Nov 2022 15:17 )  x     / 0.01 ng/mL / See Note / x     / 3.0 ng/mL          IMAGING/EKG/ETC   O/N Events: Patient admitted overnight.     Subjective/ROS: Patient seen and examined at bedside, is not happy that he is in the hospital but is staying per his wife's request. Is currently not experiencing L arm pain/twitching/discomfort and otherwise denies fevers/chills, HA, CP, SOB, n/v, changes in bowel/urinary habits.  12pt ROS otherwise negative.    VITALS  Vital Signs Last 24 Hrs  T(C): 36.7 (08 Nov 2022 11:09), Max: 36.7 (08 Nov 2022 11:09)  T(F): 98 (08 Nov 2022 11:09), Max: 98 (08 Nov 2022 11:09)  HR: 62 (08 Nov 2022 11:46) (61 - 99)  BP: 137/77 (08 Nov 2022 11:46) (106/77 - 144/83)  BP(mean): 103 (08 Nov 2022 11:46) (82 - 103)  RR: 16 (08 Nov 2022 11:46) (16 - 18)  SpO2: 93% (08 Nov 2022 11:46) (93% - 97%)    Parameters below as of 08 Nov 2022 11:46  Patient On (Oxygen Delivery Method): room air        CAPILLARY BLOOD GLUCOSE      POCT Blood Glucose.: 233 mg/dL (08 Nov 2022 12:22)  POCT Blood Glucose.: 167 mg/dL (08 Nov 2022 06:43)  POCT Blood Glucose.: 213 mg/dL (08 Nov 2022 00:11)      PHYSICAL EXAM  General: NAD  Head: NC/AT; MMM; PERRL; EOMI;  Neck: Supple; no JVD  Respiratory: CTAB; no wheezes/rales/rhonchi  Cardiovascular: Regular rhythm/rate; S1/S2+, no murmurs, rubs gallops   Gastrointestinal: Soft; NTND; bowel sounds normal and present  Extremities: WWP; no edema/cyanosis  Neurological: A&Ox3, CNII-XII grossly intact; no obvious focal deficits    MEDICATIONS  (STANDING):  allopurinol 200 milliGRAM(s) Oral daily  aspirin enteric coated 81 milliGRAM(s) Oral daily  clopidogrel Tablet 75 milliGRAM(s) Oral daily  dextrose 5%. 1000 milliLiter(s) (50 mL/Hr) IV Continuous <Continuous>  dextrose 5%. 1000 milliLiter(s) (100 mL/Hr) IV Continuous <Continuous>  dextrose 50% Injectable 25 Gram(s) IV Push once  dextrose 50% Injectable 12.5 Gram(s) IV Push once  dextrose 50% Injectable 25 Gram(s) IV Push once  glucagon  Injectable 1 milliGRAM(s) IntraMuscular once  insulin lispro (ADMELOG) corrective regimen sliding scale   SubCutaneous Before meals and at bedtime  lisinopril 20 milliGRAM(s) Oral daily  sodium chloride 0.9%. 1000 milliLiter(s) (75 mL/Hr) IV Continuous <Continuous>    MEDICATIONS  (PRN):  dextrose Oral Gel 15 Gram(s) Oral once PRN Blood Glucose LESS THAN 70 milliGRAM(s)/deciliter      No Known Allergies      LABS                        15.3   5.30  )-----------( 252      ( 07 Nov 2022 15:17 )             44.3     11-07    137  |  96  |  17  ----------------------------<  151<H>  4.3   |  27  |  1.04    Ca    10.5      07 Nov 2022 16:19  Mg     1.8     11-07    TPro  8.4<H>  /  Alb  4.8  /  TBili  0.3  /  DBili  x   /  AST  42<H>  /  ALT  34  /  AlkPhos  137<H>  11-07        CARDIAC MARKERS ( 07 Nov 2022 23:05 )  x     / 0.01 ng/mL / x     / x     / x      CARDIAC MARKERS ( 07 Nov 2022 15:17 )  x     / 0.01 ng/mL / See Note / x     / 3.0 ng/mL          IMAGING/EKG/ETC

## 2022-11-08 NOTE — DISCHARGE NOTE PROVIDER - CARE PROVIDERS DIRECT ADDRESSES
[] [facility]) [facility] &  Therapy  [address]  [phone]  F: [phone] [x] [address]  [facility]   [address]  P: [phone]  F: [phone] [] [address] &  Therapy  [address]  P: [phone]  F: [phone] [] [address]  P: [phone]  F: [phone] [] [address]  [place]   [address]   [place]: [phone]  F: [phone]      Physical Therapy Daily Treatment Note    Date:  2021  Patient Name:  Mirela Bennett  \"Danielito\"  :  1979  MRN: [id number]  Physician: Lillie Veliz                  Insurance: [facility]: [address] [place] [address]  Medical Diagnosis:    N39.3 (ICD-10-CM) - Stress incontinence   N94.10 (ICD-10-CM) - Dyspareunia in female   Rehab Codes: R27.8, M62.81, N39.3, N94.10, R29.3, N39.46, R29.3  Onset Date: 21                             Next 's appt.: 21  Visit# / total visits:      Cancels/No Shows:      Subjective:    Pain:  [] Yes  [x] No  Location:  N/A  Pain Rating: (0-10 scale) 0/10  Pain altered Tx:  [x] No  [] Yes  Action:  Comments: Pt continues to report she notes improvement with urogential function. Pt has not had intercourse since IUD placement, she is to F/U with ob/gyn on 21 to ensure correct placement. Pt requesting to leave earlier today as she has an appt.      Objective:  Modalities:   Precautions:  Exercises: Bolded completed 21 [employer] LSET: [id number]  Exercise Reps/ Time Weight/ Level Comments   Pelvic model explanation          Urge suppression  Reviewed 21    Mirror for coordination     FOCUS ON RELAXATION- filling up the entire abdomen + PF cavity allowing PF to natural bulge       goal is to be at 8 sec count with stream  transitions when urges arises - move to standing, firm pressure etc     minimize just in case peeing  kegel with effort, transitions  keep ribs over pelvis  heel raises or toe curls with lower urges  5 quick flicks with lower urgency     sit<>stand   tighten PFM prior to standing then relax once in stranding  tighten prior to sitting and then relax once sitting     exhale with effort and lifting pelvic floor contraction  elevate hips during kegels                            1 min guided meditation HEP       Diaphragmatic breathing with PF lengthening 1'       LTR with emph on DB & TA brace 10x Green swiss Added 8/16   DKTC with emph on pelvic bracing 15x  Green swiss Added 8/16   Bridges 10x2 Green swiss Added 8/16   Happy Baby Stretch 1min       TrA palpation & bracing  Reviewed       TrA + bent knee fall out 10x ea       Piriformis stretch 1min ea      HS stretch 1min strap Added 8/16   hooklying hip add sets 10x 5\" Ball squeeze + pelvic floor contraction- added 7/27/21   Hooklying hip abd 15x blueberry Added 7/27/21; inc reps 8/16; inc band 8/23   Bridges 10x2 blueberry Added band 8/16;   Inc band 8/23   Endurance holds 10x5\" Green swiss On ball- see below              Sidelying         Clams 15x lime  Added 8/16   Reverse Clams 15x lime Added 8/16         Quadruped:      Cat/camel 10x ea 5\" hold Added 7/27/21- cued to coordinate with diaphragmatic breathing   Child's pose 1min  Added 8/16               Seated swiss ball      Endurance holds 10x5\" Green swiss Added 8/16   Pelvic Circles 10x Green swiss Added 8/16   Pelvic Tilts 10x Green swiss Added 8/16         Standing      B Shoulder ER with loop and pelvic bracing  10x Lime Added 8/16   B shoulder Ext with pelvic bracing  10x Lime Added 8/16   Scap retract with pelvic bracing 10x Lime  Added 8/16   Other:          Specific Instructions for next treatment: review bladder diary, assess via Biofeedback using internal sensor, progress PREs core strength, may suggest pessary long term if conservative management fails     Treatment Charges: Mins Units   []  Modalities     [x]  Ther Exercise 39 3   []  Manual Therapy     []  Ther Activities     []  Aquatics     []  Vasocompression     []  Other     Total Treatment time 39 3       Assessment: [x] Progressing toward goals. Pt requesting early dismissal from PT; exercises were limited today, as a result. Supervising again PT defers internal vaginal work due to pt not having had follow up yet to ensure appropriate IUD placement - will F/U tomorrow. Pt again with good tolerance to PREs to target core and PFM. Continued endurance holds in sitting to further progress PFM strength against gravity on the ball for proprioceptive input & feedback. Pt again able to complete PREs without any adverse effects, but cont to require cueing and guidance for proper diaphragmatic breathing with pelvic bracing. Able to correct with moderate VCs and guidance. Pt with better pelvic stability with sidelying ex. Pt again forgot bladder diary but ensures she is doing better overall. Post-tx pt feels good overall. Given blueberry band for HEP. Will cont to progress as tolerated. [] No change. [] Other:  [x] Patient would continue to benefit from skilled physical therapy services in order to: strengthen PF, core, and hips and to improve postural awareness/body mechanics. Problems:   [x]? ? ? Pain: pelvic pain   [x]? ? ? Strength: core, PF + B hip weakness   [x]? ? ? Function: 45.8% impairment on SILVIO, 11 point impairment on PPI  [x]? ? Other: poor (lordotic) posture, discomfort during sex; stress/urge incontinence, reduced B SLS ability, B pes planus; reduced B SLR & B SLS + t-sign; SHAWN       STG: (to be met in 6 treatments)  1. Able to isolate PF musculature  2. ? Strength: PF endurance 5 seconds or greater    3. ? Function:no reports of leaking with cough, sneeze and laugh  4. Independent with Home Exercise Programs  5.  Pt to demo ability to engage and maintain TA contraction to indicate improved core stability  6. Pt to maintain B SLS for 15sec or greater without significant compensations to reduce load transfer and improve SL stability & endurance. 7. Pt will report ability to have partner achieve deeper penetration with 50% reduction of pain.       LTG: (to be met in 12 treatments)  1. Pt will report 0/10 average pelvic/hip pain for improved QOL  2. Pt to achieve PF strength of 4/5 & endurance for >8 seconds for optimal PF function   3. Able to perform all advanced ADL's without leaking  4. Bilateral hip/core strength to 4/5 or greater for improve hip stability during activity   5. Pt to demo ability to hold forearm plank for >25 seconds to indicate improved core stability  6. Pt will report a little bit or not all all on the pelvic pain inventory for level of intimacy & 2-3 point reduction in SILVIO to promote positive urogenital function.      7. Pt to maintain B SLS for 30sec or greater without significant compensations to reduce load transfer and improve SL stability & endurance. 8. Pt will report ability to have partner achieve deeper penetration with 90% reduction of pain.       Patient goals: strengthen pelvic floor, stop UI    Pt. Education:  [x] Yes  [] No  [x] Reviewed Prior HEP/Ed  Method of Education:   [x] Verbal- ex tech and proper breathing with pelvic brace   [x] Demo -    [x] Written-   Comprehension of Education:  [] Verbalizes understanding. [] Demonstrates understanding. [x] Needs review of new exercises, and for proper coordination during pelvic bracing & HEP compliance  [x] Demonstrates/verbalizes HEP/Ed previously given. 8/16/21: sitting kegel 10x5\"     Plan: [x] Continue current frequency toward long and short term goals.     [x] Specific Instructions for subsequent treatments: see above      Time In: 11am               Time Out: 1144am    Electronically signed by:  Kellen Washburn PT [email or web address]

## 2022-11-08 NOTE — PROGRESS NOTE ADULT - ASSESSMENT
58 y/o Male, smoker, with a PMHx of obesity, HTN HLD, Type 2 DM, Hypertriglyceridemia, gout,  FMH for CAD ( Mother with PCI in her 70's),  who presented to the [facility] ED c/o 1 month of intermittent left sided chest numbness /twitching   with radiation to L-arm (numbness), CCTA  done in ED + for severe mRCA stenosis now pending RHC.

## 2022-11-08 NOTE — PROGRESS NOTE ADULT - PROBLEM SELECTOR PLAN 1
Cath in am given evidence of mRCA stenosis on CCTA, HD stable and asymptomatic on admit, troponin neg x 1  IVF ordered as per protocol at 75 cc/h x 12 hrs. s/p dye exposure today from CCTA, Cr at baseline normal   EF unknown, TTE ordered.     CCTA in ED: Calcium score 558, 96th percentile. Sent for RHC.    Plan:   - pending RHC   - f/u echo   - cont w/ asa 81 mg   - on home atorvastatin 20 mg, restart post cath

## 2022-11-08 NOTE — DISCHARGE NOTE PROVIDER - NSDCMRMEDTOKEN_GEN_ALL_CORE_FT
allopurinol 100 mg oral tablet: 2 tab(s) orally once a day  colchicine 0.6 mg oral tablet: 1 tab(s) orally once a day, As Needed  lisinopril 20 mg oral tablet: 1 tab(s) orally once a day  metFORMIN 1000 mg oral tablet: 1 tab(s) orally 2 times a day   allopurinol 100 mg oral tablet: 2 tab(s) orally once a day  colchicine 0.6 mg oral tablet: 1 tab(s) orally once a day, As Needed  fenofibrate 134 mg oral capsule: 1 cap(s) orally once a day  hydroCHLOROthiazide 12.5 mg oral tablet: 1 tab(s) orally once a day  Jardiance 10 mg oral tablet: 1 tab(s) orally once a day (in the morning)  lisinopril 20 mg oral tablet: 1 tab(s) orally once a day  metFORMIN 1000 mg oral tablet: 1 tab(s) orally 2 times a day   allopurinol 100 mg oral tablet: 2 tab(s) orally once a day  Aspirin Enteric Coated 81 mg oral delayed release tablet: 1 tab(s) orally once a day  colchicine 0.6 mg oral tablet: 1 tab(s) orally once a day, As Needed  dapagliflozin 10 mg oral tablet: 1 tab(s) orally every 24 hours  fenofibrate 134 mg oral capsule: 1 cap(s) orally once a day  hydroCHLOROthiazide 12.5 mg oral tablet: 1 tab(s) orally once a day  Jardiance 10 mg oral tablet: 1 tab(s) orally once a day (in the morning)  Lipitor 40 mg oral tablet: 1 tab(s) orally once a day (at bedtime)  lisinopril 20 mg oral tablet: 1 tab(s) orally once a day  metFORMIN 1000 mg oral tablet: 1 tab(s) orally 2 times a day  Plavix 75 mg oral tablet: 1 tab(s) orally once a day  Plavix 75 mg oral tablet: 1 tab(s) orally once a day   allopurinol 100 mg oral tablet: 2 tab(s) orally once a day  Aspirin Enteric Coated 81 mg oral delayed release tablet: 1 tab(s) orally once a day  colchicine 0.6 mg oral tablet: 1 tab(s) orally once a day, As Needed  fenofibrate 134 mg oral capsule: 1 cap(s) orally once a day  hydroCHLOROthiazide 12.5 mg oral tablet: 1 tab(s) orally once a day  Jardiance 10 mg oral tablet: 1 tab(s) orally once a day (in the morning)  Lipitor 40 mg oral tablet: 1 tab(s) orally once a day (at bedtime)  lisinopril 20 mg oral tablet: 1 tab(s) orally once a day  metFORMIN 1000 mg oral tablet: 1 tab(s) orally 2 times a day  Plavix 75 mg oral tablet: 1 tab(s) orally once a day  Plavix 75 mg oral tablet: 1 tab(s) orally once a day

## 2022-11-08 NOTE — DISCHARGE NOTE PROVIDER - CARE PROVIDER_API CALL
Hal Olivo)  Cardiovascular Disease; Nuclear Cardiology  [address], [address] [place] [address]  Phone: [phone]  Fax: [phone]  Scheduled Appointment: 11/29/2022 11:30 AM

## 2022-11-08 NOTE — PROGRESS NOTE ADULT - ATTENDING COMMENTS
Initial attending contact date  11/8/22    . See resident note written above for details. I reviewed the resident documentation. I have personally seen and examined this patient. I reviewed vitals, labs, medications, cardiac studies, and additional imaging. I agree with the above residnet's findings and plans as written above with the following additions/statements.    57 obese M , +smoker, HTN HLD, Type 2 DM, Hypertriglyceridemia, gout, admitted with L arm numbness/tingling and chest discomfort found to have severe mRCA on ccta  -s/p CATH - s/p RORY mRCA, severe ostial D1  -ECHO with normal LVEF   -Cont ASA, plavix, statin   -TG 1000, on fenofibrate. Prevention consult   -If L arm symptoms persists, will consult neuro for possible radiculopathy

## 2022-11-08 NOTE — DISCHARGE NOTE PROVIDER - NSDCCPCAREPLAN_GEN_ALL_CORE_FT
PRINCIPAL DISCHARGE DIAGNOSIS  Diagnosis: Unstable angina  Assessment and Plan of Treatment:          PRINCIPAL DISCHARGE DIAGNOSIS  Diagnosis: Unstable angina  Assessment and Plan of Treatment: You were diagnosed with unstable angina. Unstable angina is when you have intermittent chest pain/pressure that may radiate to arm concerning for blockages in your coronary arteries. On this admission you have a cardiac catheterization done which revealed blockages in 2 of your arteries. You had a stent placed and are now stable for discharge. You will need to return in about 5 weeks to address the second blockage.   Please take your ASPIRIN and PLAVIX every day to ensure that your new heart stent(s) do not close and you do not have a heart attack. You had an echo done during this visit to evaluate your cardiac function as well. Please follow up with [id number] for further management of your coronary artery disease.       PRINCIPAL DISCHARGE DIAGNOSIS  Diagnosis: Unstable angina  Assessment and Plan of Treatment: You were diagnosed with unstable angina. Unstable angina is when you have intermittent chest pain/pressure that may radiate to arm concerning for blockages in your coronary arteries. On this admission you have a cardiac catheterization done which revealed blockages in 2 of your arteries. You had a stent placed and are now stable for discharge. You will need to return in about 5 weeks to address the second blockage.   Please take your ASPIRIN and PLAVIX every day to ensure that your new heart stent(s) do not close and you do not have a heart attack. You had an echo done during this visit to evaluate your cardiac function as well. Please follow up with Dr. Olivo on 11/29 at 11:30 AM for further management of your coronary artery disease.

## 2022-11-08 NOTE — DISCHARGE NOTE PROVIDER - HOSPITAL COURSE
Dx: TREATMENT FOR STEMI or HEART FAILURE THIS ADMISSION: YES/NO            If Yes to STEMI or Heart Failure: 7 day Follow-Up Appointment Made: Yes/No, Yes: Date/Time; IF No, why not?           Cardiac Rehab Indications (STEMI/NSTEMI/ACS/Unstable Angina/CHF/Chronic Stable Angina/Heart Surgery (CABG,Valve)/Post PCI):            *Education on benefits of Cardiac Rehab provided to patient: Yes         *Referral and Prescription Given for Cardiac Rehab: Yes/No.  If No, Why Not?  (see reasons below)         *Pt given list of locations & instructed to contact their insurance company to review list of participating providers. Yes          *Pt instructed to bring Cardiac Rehab prescription with them to Cardiology Follow up appointment for assistance with enrollment: Yes    (Reasons for No Cardiac Rehab Referral Rx - must document 1 or more options):                Patient Refused            Medical Reason: ex: needs Home Care, Home PT, severe or symptomatic AS            Patient lacks medical coverage for Cardiac Rehab            Pt discharged to Nursing Care/[facility]/Long term Care Facility            Patient Lacks Transportation or no cardiac rehab within 60 minutes driving range            Patient already participates in Cardiac Rehab            Other: (provide details) ex: Pt discharged to Hospice; prescription printer not working & pt was instructed to obtain Rx from outpt Cardiologist.    AMI: Beta Blocker Prescribed: Yes/No. If no, Why not?            ACE-I/ARB Prescribed: Yes/No. If no, Why not? ex: Entresto prescribed, Not indicated at this time, worsening renal function  CHF: Beta Blocker Prescribed: Yes/No.  If No, Why Not?           ACE-I/ARB/Entresto Prescribed: Yes/No.  If No, Why Not? ex: hypotension, worsenin-g renal function  Statin Prescribed (STEMI/NSTEMI/ACS/UA &/OR Post PCI this admission):  Yes/No; If No, No Statin Prescribed due to______  DAPT Post PCI: Prescriptions for Aspirin/Plavix/Brilinta/Effient e-prescribed to patient's pharmacy: Yes/No__.                *No Aspirin/Plavix/Brilinta/Effient prescribed due to ___. 58 y/o Male, smoker, with a PMHx of obesity, HTN HLD, Type 2 DM, Hypertriglyceridemia, gout,  FMH for CAD ( Mother with PCI in her 70's),  who presented to the [facility] ED c/o 1 month of intermittent left sided chest numbness /twitching   with radiation to L-arm (numbness), CCTA  done in ED + for severe mRCA stenosis.    #CAD (coronary artery disease).   - Evidence of mRCA stenosis on CCTA  - Plavix load 600mg ordered on admit, followed by 75mg daily in am asa 162 mg given in ED, another 162mg asa on admit for total load 324mg continue asa 81mg daily  starting 11/8  - Pending [facility] _____.  - TTE results ____.     #HTN (hypertension).   - continue home lisinopril 20mg daily and HCTZ 12.5mg      # Type 2 diabetes mellitus.   - SS insulin    Dx: TREATMENT FOR STEMI or HEART FAILURE THIS ADMISSION: NO                Cardiac Rehab Indications (STEMI/NSTEMI/ACS/Unstable Angina/CHF/Chronic Stable Angina/Heart Surgery (CABG,Valve)/Post PCI):            *Education on benefits of Cardiac Rehab provided to patient: Yes         *Referral and Prescription Given for Cardiac Rehab: Yes/No.  If No, Why Not?  (see reasons below)         *Pt given list of locations & instructed to contact their insurance company to review list of participating providers. Yes          *Pt instructed to bring Cardiac Rehab prescription with them to Cardiology Follow up appointment for assistance with enrollment: Yes    (Reasons for No Cardiac Rehab Referral Rx - must document 1 or more options):                Patient Refused            Medical Reason: ex: needs Home Care, Home PT, severe or symptomatic AS            Patient lacks medical coverage for Cardiac Rehab            Pt discharged to Nursing Care/[facility]/Long term Care Facility            Patient Lacks Transportation or no cardiac rehab within 60 minutes driving range            Patient already participates in Cardiac Rehab            Other: (provide details) ex: Pt discharged to Hospice; prescription printer not working & pt was instructed to obtain Rx from outpt Cardiologist.    Statin Prescribed (STEMI/NSTEMI/ACS/UA &/OR Post PCI this admission):  Yes/No; If No, No Statin Prescribed due to______  DAPT Post PCI: Prescriptions for Aspirin/Plavix/Brilinta/Effient e-prescribed to patient's pharmacy: Yes/No__.                *No Aspirin/Plavix/Brilinta/Effient prescribed due to ___. 58 y/o Male, smoker, with a PMHx of obesity, HTN HLD, Type 2 DM, Hypertriglyceridemia, gout,  FMH for CAD ( Mother with PCI in her 70's),  who presented to the [facility] ED c/o 1 month of intermittent left sided chest numbness /twitching   with radiation to L-arm (numbness), CCTA  done in ED + for severe mRCA stenosis.    #CAD (coronary artery disease) s/p RORY to mRCA w/ 80% occlusion  - Evidence of mRCA stenosis on CCTA  - Plavix load 600mg ordered on admit, followed by 75mg daily in am asa 162 mg given in ED, another 162mg asa on admit for total load 324mg continue asa 81mg daily starting 11/8  - Patient underwent left heart cath with RORY to mRCA 80%, patient also found to have residual oD1 80% (will return in 5 weeks for staged PCI) EDP 15  - CONCLUSIONS:  1. Normal left ventricular size and systolic function.  2. Normal right ventricular size and systolic function.  3. No significant valvular disease.  4. No evidence of pulmonary hypertension, pulmonary artery systolic pressure is 23 mmHg.  5. No pericardial effusion.  6. No prior echo is available for comparison.      #HTN (hypertension).   - continue home lisinopril 20mg daily and HCTZ 12.5mg      # Type 2 diabetes mellitus.   - SS insulin    Dx: TREATMENT FOR STEMI or HEART FAILURE THIS ADMISSION: NO                Cardiac Rehab Indications (STEMI/NSTEMI/ACS/Unstable Angina/CHF/Chronic Stable Angina/Heart Surgery (CABG,Valve)/Post PCI):            *Education on benefits of Cardiac Rehab provided to patient: Yes         *Referral and Prescription Given for Cardiac Rehab: Yes/No.  If No, Why Not?  (see reasons below)         *Pt given list of locations & instructed to contact their insurance company to review list of participating providers. Yes          *Pt instructed to bring Cardiac Rehab prescription with them to Cardiology Follow up appointment for assistance with enrollment: Yes    (Reasons for No Cardiac Rehab Referral Rx - must document 1 or more options):                Patient Refused            Medical Reason: ex: needs Home Care, Home PT, severe or symptomatic AS            Patient lacks medical coverage for Cardiac Rehab            Pt discharged to Nursing Care/[facility]/Long term Care Facility            Patient Lacks Transportation or no cardiac rehab within 60 minutes driving range            Patient already participates in Cardiac Rehab            Other: (provide details) ex: Pt discharged to Hospice; prescription printer not working & pt was instructed to obtain Rx from outpt Cardiologist.    Statin Prescribed (STEMI/NSTEMI/ACS/UA &/OR Post PCI this admission):  Yes/No; If No, No Statin Prescribed due to______  DAPT Post PCI: Prescriptions for Aspirin/Plavix/Brilinta/Effient e-prescribed to patient's pharmacy: Yes/No__.                *No Aspirin/Plavix/Brilinta/Effient prescribed due to ___. 58 y/o Male, smoker, with a PMHx of obesity, HTN HLD, Type 2 DM, Hypertriglyceridemia, gout,  FMH for CAD ( Mother with PCI in her 70's),  who presented to the [facility] ED c/o 1 month of intermittent left sided chest numbness /twitching   with radiation to L-arm (numbness), CCTA  done in ED + for severe mRCA stenosis.    #CAD (coronary artery disease) s/p RORY to mRCA w/ 80% occlusion  - Evidence of mRCA stenosis on CCTA  - Plavix load 600mg ordered on admit, followed by 75mg daily in am asa 162 mg given in ED, another 162mg asa on admit for total load 324mg continue asa 81mg daily starting 11/8  - Patient underwent left heart cath with RORY to mRCA 80%, patient also found to have residual oD1 80% (will return in 5 weeks for staged PCI) EDP 15  - ECHO 11/8: CONCLUSIONS:  1. Normal left ventricular size and systolic function.  2. Normal right ventricular size and systolic function.  3. No significant valvular disease.  4. No evidence of pulmonary hypertension, pulmonary artery systolic pressure is 23 mmHg.  5. No pericardial effusion.  6. No prior echo is available for comparison.    #HTN (hypertension).   - continue home lisinopril 20mg daily and HCTZ 12.5mg    # Type 2 diabetes mellitus.   - SS insulin    Dx: TREATMENT FOR STEMI or HEART FAILURE THIS ADMISSION: NO          Cardiac Rehab Indications (STEMI/NSTEMI/ACS/Unstable Angina/CHF/Chronic Stable Angina/Heart Surgery (CABG,Valve)/Post PCI):   Unstable Angina          *Education on benefits of Cardiac Rehab provided to patient: Yes         *Referral and Prescription Given for Cardiac Rehab: Yes         *Pt given list of locations & instructed to contact their insurance company to review list of participating providers. Yes          *Pt instructed to bring Cardiac Rehab prescription with them to Cardiology Follow up appointment for assistance with enrollment: Yes      Statin Prescribed (STEMI/NSTEMI/ACS/UA &/OR Post PCI this admission):  Yes  DAPT Post PCI: Prescriptions for Aspirin/Plavix/Brilinta/Effient e-prescribed to patient's pharmacy: Yes, plavix/asa.    58 y/o Male, smoker, with a PMHx of obesity, HTN HLD, Type 2 DM, Hypertriglyceridemia, gout,  FMH for CAD ( Mother with PCI in her 70's),  who presented to the [facility] ED c/o 1 month of intermittent left sided chest numbness /twitching   with radiation to L-arm (numbness), CCTA  done in ED + for severe mRCA stenosis.    #CAD (coronary artery disease) s/p RORY to mRCA w/ 80% occlusion  - Evidence of mRCA stenosis on CCTA  - Patient underwent left heart cath with RORY to mRCA 80%, patient also found to have residual oD1 80% (will return in 5 weeks for staged PCI) EDP 15  - Discharge with ASA 81mg and Plavix 75mg daily.   - ECHO 11/8:  1. Normal left ventricular size and systolic function.  2. Normal right ventricular size and systolic function.  3. No significant valvular disease.  4. No evidence of pulmonary hypertension, pulmonary artery systolic pressure is 23 mmHg.  5. No pericardial effusion.  6. No prior echo is available for comparison.    #HTN (hypertension).   - continue home lisinopril 20mg daily and HCTZ 12.5mg    Dx: TREATMENT FOR STEMI or HEART FAILURE THIS ADMISSION: NO          Cardiac Rehab Indications (STEMI/NSTEMI/ACS/Unstable Angina/CHF/Chronic Stable Angina/Heart Surgery (CABG,Valve)/Post PCI):   Unstable Angina          *Education on benefits of Cardiac Rehab provided to patient: Yes         *Referral and Prescription Given for Cardiac Rehab: Yes         *Pt given list of locations & instructed to contact their insurance company to review list of participating providers. Yes          *Pt instructed to bring Cardiac Rehab prescription with them to Cardiology Follow up appointment for assistance with enrollment: Yes      Statin Prescribed (STEMI/NSTEMI/ACS/UA &/OR Post PCI this admission):  Yes  DAPT Post PCI: Prescriptions for Aspirin/Plavix/Brilinta/Effient e-prescribed to patient's pharmacy: Yes, plavix/asa.

## 2022-11-09 ENCOUNTER — TRANSCRIPTION ENCOUNTER (OUTPATIENT)
Age: 57
End: 2022-11-09

## 2022-11-09 VITALS
DIASTOLIC BLOOD PRESSURE: 91 MMHG | RESPIRATION RATE: 17 BRPM | SYSTOLIC BLOOD PRESSURE: 160 MMHG | OXYGEN SATURATION: 98 % | HEART RATE: 74 BPM

## 2022-11-09 PROBLEM — E78.1 PURE HYPERGLYCERIDEMIA: Chronic | Status: ACTIVE | Noted: 2022-11-07

## 2022-11-09 PROBLEM — E78.5 HYPERLIPIDEMIA, UNSPECIFIED: Chronic | Status: ACTIVE | Noted: 2022-11-07

## 2022-11-09 PROBLEM — E11.9 TYPE 2 DIABETES MELLITUS WITHOUT COMPLICATIONS: Chronic | Status: ACTIVE | Noted: 2022-11-07

## 2022-11-09 PROBLEM — E66.9 OBESITY, UNSPECIFIED: Chronic | Status: ACTIVE | Noted: 2022-11-07

## 2022-11-09 PROBLEM — I10 ESSENTIAL (PRIMARY) HYPERTENSION: Chronic | Status: ACTIVE | Noted: 2022-11-07

## 2022-11-09 LAB
ALBUMIN SERPL ELPH-MCNC: 4.3 G/DL — SIGNIFICANT CHANGE UP (ref 3.3–5)
ALP SERPL-CCNC: 118 U/L — SIGNIFICANT CHANGE UP (ref 40–120)
ALT FLD-CCNC: <5 U/L — LOW (ref 10–45)
ANION GAP SERPL CALC-SCNC: 12 MMOL/L — SIGNIFICANT CHANGE UP (ref 5–17)
AST SERPL-CCNC: <5 U/L — LOW (ref 10–40)
BILIRUB SERPL-MCNC: 0.2 MG/DL — SIGNIFICANT CHANGE UP (ref 0.2–1.2)
BUN SERPL-MCNC: 12 MG/DL — SIGNIFICANT CHANGE UP (ref 7–23)
CALCIUM SERPL-MCNC: 9.3 MG/DL — SIGNIFICANT CHANGE UP (ref 8.4–10.5)
CHLORIDE SERPL-SCNC: 99 MMOL/L — SIGNIFICANT CHANGE UP (ref 96–108)
CO2 SERPL-SCNC: 23 MMOL/L — SIGNIFICANT CHANGE UP (ref 22–31)
CREAT SERPL-MCNC: 0.87 MG/DL — SIGNIFICANT CHANGE UP (ref 0.5–1.3)
EGFR: 102 ML/MIN/1.73M2 — SIGNIFICANT CHANGE UP
GLUCOSE BLDC GLUCOMTR-MCNC: 162 MG/DL — HIGH (ref 70–99)
GLUCOSE SERPL-MCNC: 164 MG/DL — HIGH (ref 70–99)
HCT VFR BLD CALC: 39.3 % — SIGNIFICANT CHANGE UP (ref 39–50)
HGB BLD-MCNC: 13.3 G/DL — SIGNIFICANT CHANGE UP (ref 13–17)
MAGNESIUM SERPL-MCNC: 1.8 MG/DL — SIGNIFICANT CHANGE UP (ref 1.6–2.6)
MCHC RBC-ENTMCNC: 29.2 PG — SIGNIFICANT CHANGE UP (ref 27–34)
MCHC RBC-ENTMCNC: 33.8 GM/DL — SIGNIFICANT CHANGE UP (ref 32–36)
MCV RBC AUTO: 86.2 FL — SIGNIFICANT CHANGE UP (ref 80–100)
NRBC # BLD: 0 /100 WBCS — SIGNIFICANT CHANGE UP (ref 0–0)
PHOSPHATE SERPL-MCNC: 2.9 MG/DL — SIGNIFICANT CHANGE UP (ref 2.5–4.5)
PLATELET # BLD AUTO: 205 K/UL — SIGNIFICANT CHANGE UP (ref 150–400)
POTASSIUM SERPL-MCNC: 4.2 MMOL/L — SIGNIFICANT CHANGE UP (ref 3.5–5.3)
POTASSIUM SERPL-SCNC: 4.2 MMOL/L — SIGNIFICANT CHANGE UP (ref 3.5–5.3)
PROT SERPL-MCNC: 7.3 G/DL — SIGNIFICANT CHANGE UP (ref 6–8.3)
RBC # BLD: 4.56 M/UL — SIGNIFICANT CHANGE UP (ref 4.2–5.8)
RBC # FLD: 14.1 % — SIGNIFICANT CHANGE UP (ref 10.3–14.5)
SODIUM SERPL-SCNC: 134 MMOL/L — LOW (ref 135–145)
WBC # BLD: 4.56 K/UL — SIGNIFICANT CHANGE UP (ref 3.8–10.5)
WBC # FLD AUTO: 4.56 K/UL — SIGNIFICANT CHANGE UP (ref 3.8–10.5)

## 2022-11-09 PROCEDURE — 84100 ASSAY OF PHOSPHORUS: CPT

## 2022-11-09 PROCEDURE — 36415 COLL VENOUS BLD VENIPUNCTURE: CPT

## 2022-11-09 PROCEDURE — 99239 HOSP IP/OBS DSCHRG MGMT >30: CPT

## 2022-11-09 PROCEDURE — C1874: CPT

## 2022-11-09 PROCEDURE — 83721 ASSAY OF BLOOD LIPOPROTEIN: CPT

## 2022-11-09 PROCEDURE — 83690 ASSAY OF LIPASE: CPT

## 2022-11-09 PROCEDURE — C1725: CPT

## 2022-11-09 PROCEDURE — 83036 HEMOGLOBIN GLYCOSYLATED A1C: CPT

## 2022-11-09 PROCEDURE — 82803 BLOOD GASES ANY COMBINATION: CPT

## 2022-11-09 PROCEDURE — 84484 ASSAY OF TROPONIN QUANT: CPT

## 2022-11-09 PROCEDURE — 75574 CT ANGIO HRT W/3D IMAGE: CPT | Mod: ME

## 2022-11-09 PROCEDURE — 83605 ASSAY OF LACTIC ACID: CPT

## 2022-11-09 PROCEDURE — 80061 LIPID PANEL: CPT

## 2022-11-09 PROCEDURE — 82962 GLUCOSE BLOOD TEST: CPT

## 2022-11-09 PROCEDURE — C1887: CPT

## 2022-11-09 PROCEDURE — 93307 TTE W/O DOPPLER COMPLETE: CPT

## 2022-11-09 PROCEDURE — C1894: CPT

## 2022-11-09 PROCEDURE — 93005 ELECTROCARDIOGRAM TRACING: CPT

## 2022-11-09 PROCEDURE — 83880 ASSAY OF NATRIURETIC PEPTIDE: CPT

## 2022-11-09 PROCEDURE — 87635 SARS-COV-2 COVID-19 AMP PRB: CPT

## 2022-11-09 PROCEDURE — C1769: CPT

## 2022-11-09 PROCEDURE — 85347 COAGULATION TIME ACTIVATED: CPT

## 2022-11-09 PROCEDURE — 82553 CREATINE MB FRACTION: CPT

## 2022-11-09 PROCEDURE — G1004: CPT

## 2022-11-09 PROCEDURE — C1724: CPT

## 2022-11-09 PROCEDURE — 70450 CT HEAD/BRAIN W/O DYE: CPT

## 2022-11-09 PROCEDURE — 83735 ASSAY OF MAGNESIUM: CPT

## 2022-11-09 PROCEDURE — 85025 COMPLETE CBC W/AUTO DIFF WBC: CPT

## 2022-11-09 PROCEDURE — 99285 EMERGENCY DEPT VISIT HI MDM: CPT | Mod: 25

## 2022-11-09 PROCEDURE — 82550 ASSAY OF CK (CPK): CPT

## 2022-11-09 PROCEDURE — 85027 COMPLETE CBC AUTOMATED: CPT

## 2022-11-09 PROCEDURE — 80053 COMPREHEN METABOLIC PANEL: CPT

## 2022-11-09 RX ORDER — ATORVASTATIN CALCIUM 80 MG/1
1 TABLET, FILM COATED ORAL
Qty: 0 | Refills: 0 | DISCHARGE
Start: 2022-11-09

## 2022-11-09 RX ORDER — DAPAGLIFLOZIN 10 MG/1
1 TABLET, FILM COATED ORAL
Qty: 0 | Refills: 0 | DISCHARGE
Start: 2022-11-09

## 2022-11-09 RX ORDER — ASPIRIN/CALCIUM CARB/MAGNESIUM 324 MG
1 TABLET ORAL
Qty: 0 | Refills: 0 | DISCHARGE
Start: 2022-11-09

## 2022-11-09 RX ORDER — OXYCODONE HYDROCHLORIDE 5 MG/1
2.5 TABLET ORAL ONCE
Refills: 0 | Status: DISCONTINUED | OUTPATIENT
Start: 2022-11-09 | End: 2022-11-09

## 2022-11-09 RX ORDER — CLOPIDOGREL BISULFATE 75 MG/1
1 TABLET, FILM COATED ORAL
Qty: 30 | Refills: 0
Start: 2022-11-09 | End: 2022-12-08

## 2022-11-09 RX ADMIN — OXYCODONE HYDROCHLORIDE 2.5 MILLIGRAM(S): 5 TABLET ORAL at 04:02

## 2022-11-09 RX ADMIN — Medication 200 MILLIGRAM(S): at 11:20

## 2022-11-09 RX ADMIN — Medication 81 MILLIGRAM(S): at 11:20

## 2022-11-09 RX ADMIN — Medication 12.5 MILLIGRAM(S): at 11:20

## 2022-11-09 RX ADMIN — Medication 2: at 07:06

## 2022-11-09 RX ADMIN — OXYCODONE HYDROCHLORIDE 2.5 MILLIGRAM(S): 5 TABLET ORAL at 03:22

## 2022-11-09 RX ADMIN — LISINOPRIL 20 MILLIGRAM(S): 2.5 TABLET ORAL at 05:14

## 2022-11-09 RX ADMIN — CLOPIDOGREL BISULFATE 75 MILLIGRAM(S): 75 TABLET, FILM COATED ORAL at 11:20

## 2022-11-09 NOTE — DISCHARGE NOTE NURSING/CASE MANAGEMENT/SOCIAL WORK - HISTORY OF COVID-19 VACCINATION
"Chief Complaint   Patient presents with     Recheck Medication       Initial /74 (BP Location: Left arm, Patient Position: Chair, Cuff Size: Adult Regular)  Pulse 74  Temp 97.4  F (36.3  C) (Oral)  Wt 201 lb (91.2 kg)  SpO2 100%  BMI 25.63 kg/m2 Estimated body mass index is 25.63 kg/(m^2) as calculated from the following:    Height as of 3/16/17: 6' 2.25\" (1.886 m).    Weight as of this encounter: 201 lb (91.2 kg).  Medication Reconciliation: complete   Nicolette See LETY Vanegas      " Yes

## 2022-11-09 NOTE — DISCHARGE NOTE NURSING/CASE MANAGEMENT/SOCIAL WORK - NSFLUVACAGEDISCH_IMM_ALL_CORE
Bedside and Verbal shift change report given to [address] [place] [address] (oncoming nurse) by Saloni Davenport RN (offgoing nurse). Report included the following information SBAR, Kardex, Intake/Output, MAR, Recent Results, Cardiac Rhythm NSR and Alarm Parameters . Shift Summary: Pt became more oriented throughout the night and stopped trying to climb out of bed. 6mg Ativan given. Bedside and Verbal shift change report given to [address] (oncoming nurse) by Marita Arce RN (offgoing nurse). Report included the following information SBAR, Kardex, Intake/Output, MAR, Recent Results, Cardiac Rhythm NSR and Alarm Parameters . Adult

## 2022-11-09 NOTE — DISCHARGE NOTE NURSING/CASE MANAGEMENT/SOCIAL WORK - PATIENT PORTAL LINK FT
You can access the FollowMyHealth Patient Portal offered by [facility] by registering at the following website: [email or web address]. By joining [email or web address]’s FollowMyHealth portal, you will also be able to view your health information using other applications (apps) compatible with our system.

## 2022-11-16 DIAGNOSIS — E66.9 OBESITY, UNSPECIFIED: ICD-10-CM

## 2022-11-16 DIAGNOSIS — Z79.84 LONG TERM (CURRENT) USE OF ORAL HYPOGLYCEMIC DRUGS: ICD-10-CM

## 2022-11-16 DIAGNOSIS — E78.1 PURE HYPERGLYCERIDEMIA: ICD-10-CM

## 2022-11-16 DIAGNOSIS — F17.210 NICOTINE DEPENDENCE, CIGARETTES, UNCOMPLICATED: ICD-10-CM

## 2022-11-16 DIAGNOSIS — M10.9 GOUT, UNSPECIFIED: ICD-10-CM

## 2022-11-16 DIAGNOSIS — I10 ESSENTIAL (PRIMARY) HYPERTENSION: ICD-10-CM

## 2022-11-16 DIAGNOSIS — E11.9 TYPE 2 DIABETES MELLITUS WITHOUT COMPLICATIONS: ICD-10-CM

## 2022-11-16 DIAGNOSIS — I25.110 ATHEROSCLEROTIC HEART DISEASE OF NATIVE CORONARY ARTERY WITH UNSTABLE ANGINA PECTORIS: ICD-10-CM

## 2022-11-16 DIAGNOSIS — E78.5 HYPERLIPIDEMIA, UNSPECIFIED: ICD-10-CM

## 2022-11-16 DIAGNOSIS — I25.84 CORONARY ATHEROSCLEROSIS DUE TO CALCIFIED CORONARY LESION: ICD-10-CM

## 2022-11-29 ENCOUNTER — APPOINTMENT (OUTPATIENT)
Dept: HEART AND VASCULAR | Facility: CLINIC | Age: 57
End: 2022-11-29

## 2022-11-29 ENCOUNTER — NON-APPOINTMENT (OUTPATIENT)
Age: 57
End: 2022-11-29

## 2022-11-29 VITALS
HEIGHT: 70 IN | BODY MASS INDEX: 36.08 KG/M2 | SYSTOLIC BLOOD PRESSURE: 146 MMHG | TEMPERATURE: 95.6 F | HEART RATE: 80 BPM | DIASTOLIC BLOOD PRESSURE: 84 MMHG | WEIGHT: 252 LBS | OXYGEN SATURATION: 98 %

## 2022-11-29 PROCEDURE — 99215 OFFICE O/P EST HI 40 MIN: CPT

## 2022-11-29 PROCEDURE — 93000 ELECTROCARDIOGRAM COMPLETE: CPT

## 2022-11-29 RX ORDER — HYDROCHLOROTHIAZIDE 12.5 MG/1
12.5 TABLET ORAL
Qty: 90 | Refills: 0 | Status: DISCONTINUED | COMMUNITY
Start: 2022-02-06 | End: 2022-11-29

## 2022-11-29 RX ORDER — ATORVASTATIN CALCIUM 20 MG/1
20 TABLET, FILM COATED ORAL
Qty: 90 | Refills: 0 | Status: DISCONTINUED | COMMUNITY
Start: 2022-10-05 | End: 2022-11-29

## 2022-11-29 RX ORDER — LISINOPRIL 40 MG/1
40 TABLET ORAL
Qty: 90 | Refills: 0 | Status: DISCONTINUED | COMMUNITY
Start: 2022-10-05 | End: 2022-11-29

## 2022-11-29 RX ORDER — COLCHICINE 0.6 MG/1
0.6 TABLET ORAL
Qty: 90 | Refills: 0 | Status: ACTIVE | COMMUNITY
Start: 2022-10-05

## 2022-11-29 RX ORDER — HYDROXYZINE HYDROCHLORIDE 25 MG/1
25 TABLET ORAL
Qty: 21 | Refills: 0 | Status: DISCONTINUED | COMMUNITY
Start: 2022-10-29 | End: 2022-11-29

## 2022-11-29 NOTE — HISTORY OF PRESENT ILLNESS
[FreeTextEntry1] : 57 year old man with history of DM2, HTN, Hyperlipidemia (hyperttriglyceridemia c/b pancreatitis in 2020), gout, CAD s/p PCI w/RORY to mRCA on 11/8/22, D1 (80% - needs to be staged), is here to establish care. \par \par 11/29/22: Pt was here in ER with L shoulder pain and numbness which led to a cardiac CT from the ER, CAD was diagnosed and [facility]. L Shoulder and arm pain and numbness is a little better but still did not resolve. He is trying to lose weight. No shortness of breath. No dyspnea. Unlimited exercise tolerance on flat ground. No orthopnea. No PND.\par _____________________________________\par 11/29/22: EKG - NSR, no ST-T changes\par _____________________________________\par 11/8/22: ECHO: Normal biventricular systolic function, EF 60-65% no soig valvular dz, PASP 23 mmHg\par __________________________________________________________________________________\par 11/7/22: Cardiac CT - Ca Score 558, severe stenosis in mRCA, other vessels non-obstructive\par __________________________________________________________________________________\par 11/8/22: CATH - mild LCX, LAD RPDA dz. ostial D1 80% stenosis, mRCA 80% stenosis, s/p PCI\par ___________________________________________________________________________________

## 2022-11-29 NOTE — DISCUSSION/SUMMARY
[FreeTextEntry1] : 57 year old man with history of DM2, HTN, Hyperlipidemia (hyperttriglyceridemia c/b pancreatitis in 2020), gout, CAD s/p PCI w/RORY to mRCA on 11/8/22, D1 (80% - needs to be staged), is here to establish care. \par \par # CAD - stable. Cont ASA and Plavix. Referral for staged PCI of D1 80 % lesion.\par \par # L arm pain and numbness -  There is likely a component of radiculopathy as a cause of patient's L shoulder and arm pain as well as numbness. However, it starts in the pectoralis area. He got a neck MRI and is awaiting results. \par \par # HTN - needs slightly better control. Cont Lisinopril 40 mg qd. increase HCTZ to 25 mg qd. Start Metoprolol 12.5 mg bid\par \par # Hypertriglyceridemia - TG still 1059 on Lipitor 20 mg qhs, HDL 22, LDL could not be calc. Still at risk for recurrent pancreatitis with TG > 1000. Cont Fenofibrate 145 mg qd. Switch Lipitor 20 mg to Crestor 40 mg qhs.  If TG are still not well controlled on Crestor 40 mg qhs and Fenofibrate, will consider adding Vascepa\par \par # DM2 - A1c 7. Cont Jardiance and Metformin  [EKG obtained to assist in diagnosis and management of assessed problem(s)] : EKG obtained to assist in diagnosis and management of assessed problem(s)

## 2022-12-14 VITALS
SYSTOLIC BLOOD PRESSURE: 137 MMHG | HEART RATE: 62 BPM | DIASTOLIC BLOOD PRESSURE: 76 MMHG | WEIGHT: 244.93 LBS | HEIGHT: 70 IN | TEMPERATURE: 97 F | OXYGEN SATURATION: 97 % | RESPIRATION RATE: 16 BRPM

## 2022-12-14 RX ORDER — CHLORHEXIDINE GLUCONATE 213 G/1000ML
1 SOLUTION TOPICAL ONCE
Refills: 0 | Status: DISCONTINUED | OUTPATIENT
Start: 2022-12-16 | End: 2022-12-30

## 2022-12-14 NOTE — H&P ADULT - NSHPLABSRESULTS_GEN_ALL_CORE
13.7   4.74  )-----------( 269      ( 16 Dec 2022 10:42 )             40.3       12-16    134<L>  |  96  |  19  ----------------------------<  109<H>  4.3   |  28  |  1.01    Ca    9.7      16 Dec 2022 11:13  Mg     2.2     12-16    TPro  8.0  /  Alb  4.7  /  TBili  0.2  /  DBili  x   /  AST  29  /  ALT  28  /  AlkPhos  97  12-16      PT/INR - ( 16 Dec 2022 10:42 )   PT: 11.9 sec;   INR: 1.00          PTT - ( 16 Dec 2022 10:42 )  PTT:34.8 sec    CARDIAC MARKERS ( 16 Dec 2022 11:13 )  x     / x     / 368 U/L / x     / 3.4 ng/mL            EKG: NSR, TWI in III

## 2022-12-14 NOTE — H&P ADULT - ASSESSMENT
57M w/ FHx CAD and PMHx HTN HLD, DM-II, CAD s/p PCI (RORY mRCA w/ residual ostial D1 on 11/7/22) and obesity, returns to [facility] for staged PCI of residual CAD.    -ASA 3, Mallampati 3  -Pt compliant w/ home ASA 81mg and Plavix 75mg, last dose ____.   -Pre cath IVF Hydration: NS 250cc bolus then c/w maintenance NS @ 75cc/hr  -Pre cath consented    Risks & benefits of procedure and alternative therapy have been explained to the patient including but not limited to: allergic reaction, bleeding w/possible need for blood transfusion, infection, renal and vascular compromise, limb damage, arrhythmia, stroke, vessel dissection/perforation, Myocardial infarction, emergent CABG. Informed consent obtained and in chart.  57M w/ FHx CAD and PMHx HTN HLD, DM-II, CAD s/p PCI (RORY mRCA w/ residual ostial D1 on 11/7/22), Gout and obesity, returns to [facility] for staged PCI of residual CAD.    -ASA 3, Mallampati 3  -Pt compliant w/ home ASA 81mg and Plavix 75mg, last dose 12/16/22, no additional meds required prior to cath.   -Pre cath IVF Hydration: NS 250cc bolus then c/w maintenance NS @ 75cc/hr  -Pre cath consented    Risks & benefits of procedure and alternative therapy have been explained to the patient including but not limited to: allergic reaction, bleeding w/possible need for blood transfusion, infection, renal and vascular compromise, limb damage, arrhythmia, stroke, vessel dissection/perforation, Myocardial infarction, emergent CABG. Informed consent obtained and in chart.  57M, recent former smoker, w/ FHx CAD and PMHx HTN HLD, DM-II, CAD s/p PCI (RORY mRCA w/ residual ostial D1 on 11/7/22), Gout and obesity, returns to [facility] for staged PCI of residual CAD.    -ASA 3, Mallampati 3  -Pt compliant w/ home ASA 81mg and Plavix 75mg, last dose 12/16/22, no additional meds required prior to cath.   -Pre cath IVF Hydration: NS 250cc bolus then c/w maintenance NS @ 75cc/hr  -Pre cath consented    Risks & benefits of procedure and alternative therapy have been explained to the patient including but not limited to: allergic reaction, bleeding w/possible need for blood transfusion, infection, renal and vascular compromise, limb damage, arrhythmia, stroke, vessel dissection/perforation, Myocardial infarction, emergent CABG. Informed consent obtained and in chart.

## 2022-12-14 NOTE — H&P ADULT - HISTORY OF PRESENT ILLNESS
cardiologist: Dr. Geovani Quinonez:  Escort;  Pharmacy:     56 y/o Male with a PMHx of obesity, HTN HLD, Type 2 DM, Hypertriglyceridemia, FMH for CAD ( Mother with PCI in her 70's), CAD s/p PCI on 11/7/22  who presented initially to [facility] ED  c/o 1 month of intermittent left sided chest numbness /twitching   with radiation to L-arm (numbness).In ED CP free, CCTA  was done in ED 11/07  which showed the Calcium score is at 558 Agatston units,  Mid RCA has severe stenosis. Patient underwent left heart cath 11/7/22 with RORY to mRCA 80%, patient also found to have residual oD1 80% and returning for staged PCI. ECHO was done on 11/8/22 which showed normal LV size and systolic function , no significant valvular disease.  Pt claims that he feels better after the procedure. Denies CP, SOB, dizziness, palpitations, syncope, LE edema, pnd/orthopnea, n/v, fever/chills, blood in the stool. Pt states that he is compliant with DAPT.     In light of pt's risk factors, abnormal CCTA and known residual CAD, pt is now referred to [facility] for recommended syaged PCI of LCx.         cardiologist: Dr. Geovani Quinonez:  Escort;  Pharmacy:     56 y/o Male with a PMHx of obesity, HTN HLD, Type 2 DM, Hypertriglyceridemia, H for CAD ( Mother with PCI in her 70's), CAD s/p PCI on 11/7/22  who presented initially to [facility] ED  c/o 1 month of intermittent left sided chest numbness /twitching   with radiation to L-arm (numbness).In ED CP free, CCTA  was done in ED 11/07  which showed the Calcium score is at 558 Agatston units,  Mid RCA has severe stenosis. Patient underwent left heart cath 11/7/22 with RORY to mRCA 80%, patient also found to have residual oD1 80% and returning for staged PCI. ECHO was done on 11/8/22 which showed normal LV size and systolic function , no significant valvular disease. EF 60-65%.   Pt claims that he feels better after the procedure. Denies CP, SOB, dizziness, palpitations, syncope, LE edema, pnd/orthopnea, n/v, fever/chills, blood in the stool. Pt states that he is compliant with DAPT.     In light of pt's risk factors, abnormal CCTA and known residual CAD, pt is now referred to [facility] for recommended staged PCI.          Covid:  Escort:  Pharmacy:     57M w/ FHx CAD and PMHx HTN HLD, DM-II, CAD s/p PCI (RORY mRCA w/ residual ostial D1 on 11/7/22) and obesity, returns to [facility] for staged PCI of residual CAD. Pt currently endorses ____ and reports DAPT compliance. He denies any ___ CP, palpitations, dizziness, syncope, diaphoresis, fatigue, LE edema, BLAKE, orthopnea, PND, N/V, fever, chills or recent sick contact. Most recent Cardiac Cath 11/7/22: RORY mRCA (80%); other vessels revealed LM normal, LAD mild diffuse (large vessel), ostial D1 80%, LCx mild luminal, RPDA mild diffuse. TTE 11/8/22: normal LVEF, no significant valvular disease.  Covid: negative   Escort: [facility]  Pharmacy: on file    57M w/ FHx CAD and PMHx HTN HLD, DM-II, CAD s/p PCI (RORY mRCA w/ residual ostial D1 on 11/7/22), Gout and obesity, returns to [facility] for staged PCI of residual CAD. Pt currently endorses that he feels well and reports DAPT compliance. He denies any CP, palpitations, dizziness, syncope, diaphoresis, fatigue, LE edema, BALKE, orthopnea, PND, N/V, fever, chills or recent sick contact. Most recent Cardiac Cath 11/7/22: RORY mRCA (80%); other vessels revealed LM normal, LAD mild diffuse (large vessel), ostial D1 80%, LCx mild luminal, RPDA mild diffuse. TTE 11/8/22: normal LVEF, no significant valvular disease.  Covid: negative 12/12/22 (faxed in chart)  Escort: [facility]  Pharmacy: on file    57M, former smoker, w/ FHx CAD and PMHx HTN HLD, DM-II, CAD s/p PCI (RORY mRCA w/ residual ostial D1 on 11/7/22), Gout and obesity, returns to [facility] for staged PCI of residual CAD. Pt currently endorses that he feels well and reports DAPT compliance. He denies any CP, palpitations, dizziness, syncope, diaphoresis, fatigue, LE edema, BLAKE, orthopnea, PND, N/V, fever, chills or recent sick contact. Most recent Cardiac Cath 11/7/22: RORY mRCA (80%); other vessels revealed LM normal, LAD mild diffuse (large vessel), ostial D1 80%, LCx mild luminal, RPDA mild diffuse. TTE 11/8/22: normal LVEF, no significant valvular disease.  Covid: negative 12/12/22 (faxed in chart)  Escort: [facility]  Pharmacy: [facility] ([place])    57M, recent former smoker, w/ FHx CAD and PMHx HTN HLD, DM-II, CAD s/p PCI (RORY mRCA w/ residual ostial D1 on 11/7/22), Gout and obesity, returns to [facility] for staged PCI of residual CAD. Pt currently endorses that he feels well and reports DAPT compliance. He denies any CP, palpitations, dizziness, syncope, diaphoresis, fatigue, LE edema, BLAKE, orthopnea, PND, N/V, fever, chills or recent sick contact. Most recent Cardiac Cath 11/7/22: RORY mRCA (80%); other vessels revealed LM normal, LAD mild diffuse (large vessel), ostial D1 80%, LCx mild luminal, RPDA mild diffuse. TTE 11/8/22: normal LVEF, no significant valvular disease.

## 2022-12-14 NOTE — H&P ADULT - NSICDXPASTMEDICALHX_GEN_ALL_CORE_FT
PAST MEDICAL HISTORY:  DM (diabetes mellitus), type 2     HLD (hyperlipidemia)     HTN (hypertension)     Hypertriglyceridemia     No pertinent past medical history     Obesity      PAST MEDICAL HISTORY:  CAD (coronary artery disease)     DM (diabetes mellitus), type 2     HLD (hyperlipidemia)     HTN (hypertension)     Hypertriglyceridemia     Obesity

## 2022-12-16 ENCOUNTER — OUTPATIENT (OUTPATIENT)
Dept: OUTPATIENT SERVICES | Facility: HOSPITAL | Age: 57
LOS: 1 days | Discharge: HOME | End: 2022-12-16
Payer: COMMERCIAL

## 2022-12-16 LAB
A1C WITH ESTIMATED AVERAGE GLUCOSE RESULT: 6.5 % — HIGH (ref 4–5.6)
ALBUMIN SERPL ELPH-MCNC: 4.6 G/DL — SIGNIFICANT CHANGE UP (ref 3.3–5)
ALBUMIN SERPL ELPH-MCNC: 4.7 G/DL — SIGNIFICANT CHANGE UP (ref 3.3–5)
ALP SERPL-CCNC: 95 U/L — SIGNIFICANT CHANGE UP (ref 40–120)
ALP SERPL-CCNC: 97 U/L — SIGNIFICANT CHANGE UP (ref 40–120)
ALT FLD-CCNC: 26 U/L — SIGNIFICANT CHANGE UP (ref 10–45)
ALT FLD-CCNC: 28 U/L — SIGNIFICANT CHANGE UP (ref 10–45)
ANION GAP SERPL CALC-SCNC: 10 MMOL/L — SIGNIFICANT CHANGE UP (ref 5–17)
ANION GAP SERPL CALC-SCNC: 10 MMOL/L — SIGNIFICANT CHANGE UP (ref 5–17)
APTT BLD: 34.8 SEC — SIGNIFICANT CHANGE UP (ref 27.5–35.5)
AST SERPL-CCNC: 25 U/L — SIGNIFICANT CHANGE UP (ref 10–40)
AST SERPL-CCNC: 29 U/L — SIGNIFICANT CHANGE UP (ref 10–40)
BASOPHILS # BLD AUTO: 0.01 K/UL — SIGNIFICANT CHANGE UP (ref 0–0.2)
BASOPHILS # BLD AUTO: 0.01 K/UL — SIGNIFICANT CHANGE UP (ref 0–0.2)
BASOPHILS NFR BLD AUTO: 0.2 % — SIGNIFICANT CHANGE UP (ref 0–2)
BASOPHILS NFR BLD AUTO: 0.2 % — SIGNIFICANT CHANGE UP (ref 0–2)
BILIRUB SERPL-MCNC: 0.2 MG/DL — SIGNIFICANT CHANGE UP (ref 0.2–1.2)
BILIRUB SERPL-MCNC: 0.3 MG/DL — SIGNIFICANT CHANGE UP (ref 0.2–1.2)
BUN SERPL-MCNC: 18 MG/DL — SIGNIFICANT CHANGE UP (ref 7–23)
BUN SERPL-MCNC: 19 MG/DL — SIGNIFICANT CHANGE UP (ref 7–23)
CALCIUM SERPL-MCNC: 9.2 MG/DL — SIGNIFICANT CHANGE UP (ref 8.4–10.5)
CALCIUM SERPL-MCNC: 9.7 MG/DL — SIGNIFICANT CHANGE UP (ref 8.4–10.5)
CHLORIDE SERPL-SCNC: 100 MMOL/L — SIGNIFICANT CHANGE UP (ref 96–108)
CHLORIDE SERPL-SCNC: 96 MMOL/L — SIGNIFICANT CHANGE UP (ref 96–108)
CHOLEST SERPL-MCNC: 116 MG/DL — SIGNIFICANT CHANGE UP
CK MB CFR SERPL CALC: 3.4 NG/ML — SIGNIFICANT CHANGE UP (ref 0–6.7)
CK SERPL-CCNC: 368 U/L — HIGH (ref 30–200)
CO2 SERPL-SCNC: 28 MMOL/L — SIGNIFICANT CHANGE UP (ref 22–31)
CO2 SERPL-SCNC: 28 MMOL/L — SIGNIFICANT CHANGE UP (ref 22–31)
CREAT SERPL-MCNC: 1.01 MG/DL — SIGNIFICANT CHANGE UP (ref 0.5–1.3)
CREAT SERPL-MCNC: 1.12 MG/DL — SIGNIFICANT CHANGE UP (ref 0.5–1.3)
EGFR: 78 ML/MIN/1.73M2 — SIGNIFICANT CHANGE UP
EGFR: 88 ML/MIN/1.73M2 — SIGNIFICANT CHANGE UP
EOSINOPHIL # BLD AUTO: 0.09 K/UL — SIGNIFICANT CHANGE UP (ref 0–0.5)
EOSINOPHIL # BLD AUTO: 0.1 K/UL — SIGNIFICANT CHANGE UP (ref 0–0.5)
EOSINOPHIL NFR BLD AUTO: 1.9 % — SIGNIFICANT CHANGE UP (ref 0–6)
EOSINOPHIL NFR BLD AUTO: 2.2 % — SIGNIFICANT CHANGE UP (ref 0–6)
ESTIMATED AVERAGE GLUCOSE: 140 MG/DL — HIGH (ref 68–114)
GLUCOSE BLDC GLUCOMTR-MCNC: 107 MG/DL — HIGH (ref 70–99)
GLUCOSE SERPL-MCNC: 109 MG/DL — HIGH (ref 70–99)
GLUCOSE SERPL-MCNC: 178 MG/DL — HIGH (ref 70–99)
HCT VFR BLD CALC: 39.4 % — SIGNIFICANT CHANGE UP (ref 39–50)
HCT VFR BLD CALC: 40.3 % — SIGNIFICANT CHANGE UP (ref 39–50)
HDLC SERPL-MCNC: 43 MG/DL — SIGNIFICANT CHANGE UP
HGB BLD-MCNC: 13.1 G/DL — SIGNIFICANT CHANGE UP (ref 13–17)
HGB BLD-MCNC: 13.7 G/DL — SIGNIFICANT CHANGE UP (ref 13–17)
IMM GRANULOCYTES NFR BLD AUTO: 0.2 % — SIGNIFICANT CHANGE UP (ref 0–0.9)
IMM GRANULOCYTES NFR BLD AUTO: 0.4 % — SIGNIFICANT CHANGE UP (ref 0–0.9)
INR BLD: 1 — SIGNIFICANT CHANGE UP (ref 0.88–1.16)
ISTAT ACTK (ACTIVATED CLOTTING TIME KAOLIN): 299 SEC — HIGH (ref 74–137)
LIPID PNL WITH DIRECT LDL SERPL: 53 MG/DL — SIGNIFICANT CHANGE UP
LYMPHOCYTES # BLD AUTO: 1.6 K/UL — SIGNIFICANT CHANGE UP (ref 1–3.3)
LYMPHOCYTES # BLD AUTO: 1.72 K/UL — SIGNIFICANT CHANGE UP (ref 1–3.3)
LYMPHOCYTES # BLD AUTO: 34.4 % — SIGNIFICANT CHANGE UP (ref 13–44)
LYMPHOCYTES # BLD AUTO: 36.3 % — SIGNIFICANT CHANGE UP (ref 13–44)
MAGNESIUM SERPL-MCNC: 2 MG/DL — SIGNIFICANT CHANGE UP (ref 1.6–2.6)
MAGNESIUM SERPL-MCNC: 2.2 MG/DL — SIGNIFICANT CHANGE UP (ref 1.6–2.6)
MCHC RBC-ENTMCNC: 28.6 PG — SIGNIFICANT CHANGE UP (ref 27–34)
MCHC RBC-ENTMCNC: 28.7 PG — SIGNIFICANT CHANGE UP (ref 27–34)
MCHC RBC-ENTMCNC: 33.2 GM/DL — SIGNIFICANT CHANGE UP (ref 32–36)
MCHC RBC-ENTMCNC: 34 GM/DL — SIGNIFICANT CHANGE UP (ref 32–36)
MCV RBC AUTO: 84.5 FL — SIGNIFICANT CHANGE UP (ref 80–100)
MCV RBC AUTO: 86 FL — SIGNIFICANT CHANGE UP (ref 80–100)
MONOCYTES # BLD AUTO: 0.38 K/UL — SIGNIFICANT CHANGE UP (ref 0–0.9)
MONOCYTES # BLD AUTO: 0.49 K/UL — SIGNIFICANT CHANGE UP (ref 0–0.9)
MONOCYTES NFR BLD AUTO: 10.3 % — SIGNIFICANT CHANGE UP (ref 2–14)
MONOCYTES NFR BLD AUTO: 8.2 % — SIGNIFICANT CHANGE UP (ref 2–14)
NEUTROPHILS # BLD AUTO: 2.41 K/UL — SIGNIFICANT CHANGE UP (ref 1.8–7.4)
NEUTROPHILS # BLD AUTO: 2.55 K/UL — SIGNIFICANT CHANGE UP (ref 1.8–7.4)
NEUTROPHILS NFR BLD AUTO: 50.9 % — SIGNIFICANT CHANGE UP (ref 43–77)
NEUTROPHILS NFR BLD AUTO: 54.8 % — SIGNIFICANT CHANGE UP (ref 43–77)
NON HDL CHOLESTEROL: 73 MG/DL — SIGNIFICANT CHANGE UP
NRBC # BLD: 0 /100 WBCS — SIGNIFICANT CHANGE UP (ref 0–0)
NRBC # BLD: 0 /100 WBCS — SIGNIFICANT CHANGE UP (ref 0–0)
PLATELET # BLD AUTO: 253 K/UL — SIGNIFICANT CHANGE UP (ref 150–400)
PLATELET # BLD AUTO: 269 K/UL — SIGNIFICANT CHANGE UP (ref 150–400)
POTASSIUM SERPL-MCNC: 3.9 MMOL/L — SIGNIFICANT CHANGE UP (ref 3.5–5.3)
POTASSIUM SERPL-MCNC: 4.3 MMOL/L — SIGNIFICANT CHANGE UP (ref 3.5–5.3)
POTASSIUM SERPL-SCNC: 3.9 MMOL/L — SIGNIFICANT CHANGE UP (ref 3.5–5.3)
POTASSIUM SERPL-SCNC: 4.3 MMOL/L — SIGNIFICANT CHANGE UP (ref 3.5–5.3)
PROT SERPL-MCNC: 7.4 G/DL — SIGNIFICANT CHANGE UP (ref 6–8.3)
PROT SERPL-MCNC: 8 G/DL — SIGNIFICANT CHANGE UP (ref 6–8.3)
PROTHROM AB SERPL-ACNC: 11.9 SEC — SIGNIFICANT CHANGE UP (ref 10.5–13.4)
RBC # BLD: 4.58 M/UL — SIGNIFICANT CHANGE UP (ref 4.2–5.8)
RBC # BLD: 4.77 M/UL — SIGNIFICANT CHANGE UP (ref 4.2–5.8)
RBC # FLD: 14.5 % — SIGNIFICANT CHANGE UP (ref 10.3–14.5)
RBC # FLD: 14.6 % — HIGH (ref 10.3–14.5)
SODIUM SERPL-SCNC: 134 MMOL/L — LOW (ref 135–145)
SODIUM SERPL-SCNC: 138 MMOL/L — SIGNIFICANT CHANGE UP (ref 135–145)
TRIGL SERPL-MCNC: 101 MG/DL — SIGNIFICANT CHANGE UP
WBC # BLD: 4.65 K/UL — SIGNIFICANT CHANGE UP (ref 3.8–10.5)
WBC # BLD: 4.74 K/UL — SIGNIFICANT CHANGE UP (ref 3.8–10.5)
WBC # FLD AUTO: 4.65 K/UL — SIGNIFICANT CHANGE UP (ref 3.8–10.5)
WBC # FLD AUTO: 4.74 K/UL — SIGNIFICANT CHANGE UP (ref 3.8–10.5)

## 2022-12-16 PROCEDURE — C1725: CPT

## 2022-12-16 PROCEDURE — C1894: CPT

## 2022-12-16 PROCEDURE — C1874: CPT

## 2022-12-16 PROCEDURE — 80053 COMPREHEN METABOLIC PANEL: CPT

## 2022-12-16 PROCEDURE — 92928 PRQ TCAT PLMT NTRAC ST 1 LES: CPT | Mod: LD

## 2022-12-16 PROCEDURE — 85730 THROMBOPLASTIN TIME PARTIAL: CPT

## 2022-12-16 PROCEDURE — 83036 HEMOGLOBIN GLYCOSYLATED A1C: CPT

## 2022-12-16 PROCEDURE — 85610 PROTHROMBIN TIME: CPT

## 2022-12-16 PROCEDURE — 82550 ASSAY OF CK (CPK): CPT

## 2022-12-16 PROCEDURE — 80061 LIPID PANEL: CPT

## 2022-12-16 PROCEDURE — 99152 MOD SED SAME PHYS/QHP 5/>YRS: CPT

## 2022-12-16 PROCEDURE — C1769: CPT

## 2022-12-16 PROCEDURE — 85347 COAGULATION TIME ACTIVATED: CPT

## 2022-12-16 PROCEDURE — 36415 COLL VENOUS BLD VENIPUNCTURE: CPT

## 2022-12-16 PROCEDURE — 82962 GLUCOSE BLOOD TEST: CPT

## 2022-12-16 PROCEDURE — C9600: CPT | Mod: LD

## 2022-12-16 PROCEDURE — C1887: CPT

## 2022-12-16 PROCEDURE — 83735 ASSAY OF MAGNESIUM: CPT

## 2022-12-16 PROCEDURE — 99153 MOD SED SAME PHYS/QHP EA: CPT

## 2022-12-16 PROCEDURE — 93010 ELECTROCARDIOGRAM REPORT: CPT | Mod: 76

## 2022-12-16 PROCEDURE — 92921: CPT | Mod: LD

## 2022-12-16 PROCEDURE — 93005 ELECTROCARDIOGRAM TRACING: CPT

## 2022-12-16 PROCEDURE — 82553 CREATINE MB FRACTION: CPT

## 2022-12-16 PROCEDURE — 85025 COMPLETE CBC W/AUTO DIFF WBC: CPT

## 2022-12-16 RX ORDER — EMPAGLIFLOZIN 10 MG/1
1 TABLET, FILM COATED ORAL
Qty: 0 | Refills: 0 | DISCHARGE

## 2022-12-16 RX ORDER — LISINOPRIL 2.5 MG/1
1 TABLET ORAL
Qty: 0 | Refills: 0 | DISCHARGE

## 2022-12-16 RX ORDER — SODIUM CHLORIDE 9 MG/ML
250 INJECTION INTRAMUSCULAR; INTRAVENOUS; SUBCUTANEOUS ONCE
Refills: 0 | Status: COMPLETED | OUTPATIENT
Start: 2022-12-16 | End: 2022-12-16

## 2022-12-16 RX ORDER — COLCHICINE 0.6 MG
1 TABLET ORAL
Qty: 0 | Refills: 0 | DISCHARGE

## 2022-12-16 RX ORDER — METFORMIN HYDROCHLORIDE 850 MG/1
1 TABLET ORAL
Qty: 0 | Refills: 0 | DISCHARGE

## 2022-12-16 RX ORDER — SODIUM CHLORIDE 9 MG/ML
500 INJECTION INTRAMUSCULAR; INTRAVENOUS; SUBCUTANEOUS
Refills: 0 | Status: DISCONTINUED | OUTPATIENT
Start: 2022-12-16 | End: 2022-12-30

## 2022-12-16 RX ORDER — ROSUVASTATIN CALCIUM 5 MG/1
1 TABLET ORAL
Qty: 0 | Refills: 0 | DISCHARGE

## 2022-12-16 RX ORDER — ALLOPURINOL 300 MG
2 TABLET ORAL
Qty: 0 | Refills: 0 | DISCHARGE

## 2022-12-16 RX ORDER — ASPIRIN/CALCIUM CARB/MAGNESIUM 324 MG
1 TABLET ORAL
Qty: 30 | Refills: 11
Start: 2022-12-16 | End: 2023-12-10

## 2022-12-16 RX ORDER — FENOFIBRATE,MICRONIZED 130 MG
1 CAPSULE ORAL
Qty: 0 | Refills: 0 | DISCHARGE

## 2022-12-16 RX ORDER — CLOPIDOGREL BISULFATE 75 MG/1
1 TABLET, FILM COATED ORAL
Qty: 30 | Refills: 11
Start: 2022-12-16 | End: 2023-12-10

## 2022-12-16 RX ORDER — HYDROCHLOROTHIAZIDE 25 MG
1 TABLET ORAL
Qty: 0 | Refills: 0 | DISCHARGE

## 2022-12-16 RX ADMIN — SODIUM CHLORIDE 75 MILLILITER(S): 9 INJECTION INTRAMUSCULAR; INTRAVENOUS; SUBCUTANEOUS at 12:11

## 2022-12-16 RX ADMIN — SODIUM CHLORIDE 500 MILLILITER(S): 9 INJECTION INTRAMUSCULAR; INTRAVENOUS; SUBCUTANEOUS at 12:16

## 2022-12-16 RX ADMIN — SODIUM CHLORIDE 220 MILLILITER(S): 9 INJECTION INTRAMUSCULAR; INTRAVENOUS; SUBCUTANEOUS at 14:33

## 2022-12-16 NOTE — PROGRESS NOTE ADULT - SUBJECTIVE AND OBJECTIVE BOX
Interventional Cardiology PA Post PCI SDA Discharge Note      Patient without complaints. Ambulated and voided without difficulties    Afebrile, VSS    PRESCRIPTIONS/HOME MEDICATIONS:  allopurinol 100 mg oral tablet: 2 tab(s) orally once a day  Aspirin Enteric Coated 81 mg oral delayed release tablet: 1 tab(s) orally once a day  Aspirin Enteric Coated 81 mg oral delayed release tablet: 1 tab(s) orally once a day   fenofibrate 134 mg oral capsule: 1 cap(s) orally once a day  hydroCHLOROthiazide 25 mg oral tablet: 1 tab(s) orally once a day  Jardiance 10 mg oral tablet: 1 tab(s) orally once a day (in the morning)  lisinopril 40 mg oral tablet: 1 tab(s) orally once a day  metFORMIN 1000 mg oral tablet: 1 tab(s) orally 2 times a day  Plavix 75 mg oral tablet: 1 tab(s) orally once a day   Plavix 75 mg oral tablet: 1 tab(s) orally once a day  rosuvastatin 40 mg oral tablet: 1 tab(s) orally once a day        CURRENT MEDICATIONS:   chlorhexidine 4% Liquid 1 Application(s) Topical once  sodium chloride 0.9%. 500 milliLiter(s) IV Continuous <Continuous>  sodium chloride 0.9%. 500 milliLiter(s) IV Continuous <Continuous>        Ext:    	  		Right/Left              Radial :  No  hematoma,  No   bleeding, dressing; C/D/I      Pulses:    intact RAD/DP/PT to baseline     A/P:  s/p PCI: RORY D1 kissing balloon LAD and D1    1.       Follow-up with PMD/Cardiologist Dr. Olivo in 72 hours.  2.       Post procedure labs/EKG reviewed and stable.    3.       Pt given instructions on importance of taking antiplatelet medication.    4.       Stable for discharge as per attending Dr. Mendoza  after bed rest, pt voids, right wrist stable and 30 minutes of ambulation.  5.        Prescriptions for Aspirin/Plavix/Brilinta/Effient e-prescribed and submitted to patient's pharmacy Yes    6.        Patient will continue Rosuvastatin 40 mg daily   7.       Patient will continue All Other Home Medications.  (PLEASE NOTE IF ANY CHANGES).-Metformin to be held and restarted 12/19/22  8.       Discharge forms signed and copies in chart   9.       Discharge Order Entered Yes

## 2022-12-22 DIAGNOSIS — I25.118 ATHEROSCLEROTIC HEART DISEASE OF NATIVE CORONARY ARTERY WITH OTHER FORMS OF ANGINA PECTORIS: ICD-10-CM

## 2022-12-22 DIAGNOSIS — Z95.5 PRESENCE OF CORONARY ANGIOPLASTY IMPLANT AND GRAFT: ICD-10-CM

## 2022-12-22 DIAGNOSIS — R94.39 ABNORMAL RESULT OF OTHER CARDIOVASCULAR FUNCTION STUDY: ICD-10-CM

## 2023-01-25 PROBLEM — I25.10 ATHEROSCLEROTIC HEART DISEASE OF NATIVE CORONARY ARTERY WITHOUT ANGINA PECTORIS: Chronic | Status: ACTIVE | Noted: 2022-12-16

## 2023-01-31 ENCOUNTER — APPOINTMENT (OUTPATIENT)
Dept: HEART AND VASCULAR | Facility: CLINIC | Age: 58
End: 2023-01-31
Payer: COMMERCIAL

## 2023-01-31 VITALS
SYSTOLIC BLOOD PRESSURE: 114 MMHG | OXYGEN SATURATION: 98 % | TEMPERATURE: 95 F | BODY MASS INDEX: 35.93 KG/M2 | WEIGHT: 251 LBS | HEART RATE: 82 BPM | DIASTOLIC BLOOD PRESSURE: 76 MMHG | HEIGHT: 70 IN

## 2023-01-31 DIAGNOSIS — M10.9 GOUT, UNSPECIFIED: ICD-10-CM

## 2023-01-31 PROCEDURE — 99214 OFFICE O/P EST MOD 30 MIN: CPT

## 2023-01-31 NOTE — HISTORY OF PRESENT ILLNESS
[FreeTextEntry1] : 57 year old man with history of DM2, HTN, Hyperlipidemia (hyperttriglyceridemia c/b pancreatitis in 2020), gout, CAD s/p PCI w/RORY to mRCA on 11/8/22, and s/p PCI of D1 on 12/16/23 is here for follow up.\par \par 1/31/2023: L shoulder pain resolved. pt feels well post PCI of D1.\par No chest pain. No shortness of breath. No dyspnea. Unlimited exercise tolerance. No orthopnea. No PND. Needs work documents filled out. Pt is having L hand surgery on 2/16/23 for carpal tunnel syndrome\par \par 11/29/22: Pt was here in ER with L shoulder pain and numbness which led to a cardiac CT from the ER, CAD was diagnosed and he is s/p PCI to mRCA on 11/8/22. L Shoulder and arm pain and numbness is a little better but still did not resolve. He is trying to lose weight. No shortness of breath. No dyspnea. Unlimited exercise tolerance on flat ground. No orthopnea. No PND.

## 2023-01-31 NOTE — CARDIOLOGY SUMMARY
[de-identified] : 11/29/22: EKG - NSR, no ST-T changes [de-identified] : 11/8/22: ECHO: Normal biventricular systolic function, EF 60-65% no soig valvular dz, PASP 23 mmHg [de-identified] : 11/7/22: Cardiac CT - Ca Score 558, severe stenosis in mRCA, other vessels non-obstructive [de-identified] : 12/16/23: s/p PCI of D1\par 11/8/22: CATH - mild LCX, LAD RPDA dz. ostial D1 80% stenosis, mRCA 80% stenosis, s/p PCI

## 2023-01-31 NOTE — DISCUSSION/SUMMARY
[FreeTextEntry1] : 57 year old man with history of DM2, HTN, Hyperlipidemia (hyperttriglyceridemia c/b pancreatitis in 2020), gout, CAD s/p PCI w/RORY to mRCA on 11/8/22, and s/p PCI of D1 on 12/16/23 is here for follow up.\par \par # CAD - stable, no cardiac complaints. Cont ASA lifelong and Plavix (for at least 1 year).\par \par # HTN - needs slightly better control. Cont Lisinopril 40 mg qd. increase HCTZ to 25 mg qd. Start Metoprolol 12.5 mg bid\par \par # Hypertriglyceridemia - much improved and Crestor + Fenofibrate. Labs from 12/16/22: , HDL 43, LDL 53, A1c 5.3 \par Previous visit 11/29/22: TG still 1059 on Lipitor 20 mg qhs, HDL 22, LDL could not be calc. Still at risk for recurrent pancreatitis with TG > 1000. Cont Fenofibrate 145 mg qd. Switch Lipitor 20 mg to Crestor 40 mg qhs.  If TG are still not well controlled on Crestor 40 mg qhs and Fenofibrate, will consider adding Vascepa\par \par # DM2 - A1c 7, now 6.3 as of 12/16/23. Cont Jardiance and Metformin \par \par f/u in 6 mos

## 2023-02-06 ENCOUNTER — RX RENEWAL (OUTPATIENT)
Age: 58
End: 2023-02-06

## 2023-03-23 ENCOUNTER — TRANSCRIPTION ENCOUNTER (OUTPATIENT)
Age: 58
End: 2023-03-23

## 2023-03-23 ENCOUNTER — RX RENEWAL (OUTPATIENT)
Age: 58
End: 2023-03-23

## 2023-04-18 ENCOUNTER — APPOINTMENT (OUTPATIENT)
Dept: HEART AND VASCULAR | Facility: CLINIC | Age: 58
End: 2023-04-18
Payer: COMMERCIAL

## 2023-04-18 ENCOUNTER — NON-APPOINTMENT (OUTPATIENT)
Age: 58
End: 2023-04-18

## 2023-04-18 VITALS
HEIGHT: 70 IN | OXYGEN SATURATION: 99 % | WEIGHT: 250 LBS | HEART RATE: 67 BPM | BODY MASS INDEX: 35.79 KG/M2 | SYSTOLIC BLOOD PRESSURE: 128 MMHG | DIASTOLIC BLOOD PRESSURE: 82 MMHG

## 2023-04-18 DIAGNOSIS — S86.112A STRAIN OF OTHER MUSCLE(S) AND TENDON(S) OF POSTERIOR MUSCLE GROUP AT LOWER LEG LEVEL, LEFT LEG, INITIAL ENCOUNTER: ICD-10-CM

## 2023-04-18 PROCEDURE — 99214 OFFICE O/P EST MOD 30 MIN: CPT

## 2023-04-18 PROCEDURE — 93000 ELECTROCARDIOGRAM COMPLETE: CPT

## 2023-04-18 RX ORDER — GABAPENTIN 300 MG/1
300 CAPSULE ORAL
Qty: 90 | Refills: 0 | Status: DISCONTINUED | COMMUNITY
Start: 2022-10-29 | End: 2023-04-18

## 2023-04-18 NOTE — CARDIOLOGY SUMMARY
[de-identified] : 11/29/22: EKG - NSR, no ST-T changes [de-identified] : 11/7/22: Cardiac CT - Ca Score 558, severe stenosis in mRCA, other vessels non-obstructive [de-identified] : 11/8/22: ECHO: Normal biventricular systolic function, EF 60-65% no soig valvular dz, PASP 23 mmHg [de-identified] : 12/16/23: s/p PCI of D1\par 11/8/22: CATH - mild LCX, LAD RPDA dz. ostial D1 80% stenosis, mRCA 80% stenosis, s/p PCI

## 2023-04-18 NOTE — DISCUSSION/SUMMARY
[FreeTextEntry1] : 57 year old man with history of DM2, HTN, Hyperlipidemia (hyperttriglyceridemia c/b pancreatitis in 2020), gout, CAD s/p PCI w/RORY to mRCA on 11/8/22, and s/p PCI of D1 on 12/16/22 is here for follow up.\par \par # CAD - stable, no cardiac complaints. Cont ASA lifelong and Plavix (for at least 1 year).\par \par # HTN - . Controlled. Cont Lisinopril 40 mg qd. Cont HCTZ to 25 mg qd. Cont Metoprolol 12.5 mg bid\par \par # Hypertriglyceridemia - much improved and Crestor + Fenofibrate. Labs from 12/16/22: , HDL 43, LDL 53, A1c 5.3 Recheck Lipids and CMP today. \par \par # DM2 - A1c 7, now 6.3 as of 12/16/23. Cont Jardiance and Metformin Re-check A1c today. \par \par f/u in 6 mos [EKG obtained to assist in diagnosis and management of assessed problem(s)] : EKG obtained to assist in diagnosis and management of assessed problem(s)

## 2023-04-18 NOTE — HISTORY OF PRESENT ILLNESS
[FreeTextEntry1] : 57 year old man with history of DM2, HTN, Hyperlipidemia (hyperttriglyceridemia c/b pancreatitis in 2020), gout, CAD s/p PCI w/RORY to mRCA on 11/8/22, and s/p PCI of D1 on 12/16/22 is here for follow up.\par \par 4/18/23: Pt is s/p L carpal tunnel surgery. Feels better. No chest pain. No shortness of breath. No dyspnea. Unlimited exercise tolerance. No orthopnea. No PND.Pt says he does not eat much / not hungry. Drinks a shake during the day and then just has dinner. \par \par 1/31/2023: L shoulder pain resolved. pt feels well post PCI of D1.\par No chest pain. No shortness of breath. No dyspnea. Unlimited exercise tolerance. No orthopnea. No PND. Needs work documents filled out. Pt is having L hand surgery on 2/16/23 for carpal tunnel syndrome\par \par 11/29/22: Pt was here in ER with L shoulder pain and numbness which led to a cardiac CT from the ER, CAD was diagnosed and he is s/p PCI to mRCA on 11/8/22. L Shoulder and arm pain and numbness is a little better but still did not resolve. He is trying to lose weight. No shortness of breath. No dyspnea. Unlimited exercise tolerance on flat ground. No orthopnea. No PND.

## 2023-04-19 LAB
ALBUMIN SERPL ELPH-MCNC: 5 G/DL
ALP BLD-CCNC: 90 U/L
ALT SERPL-CCNC: 27 U/L
ANION GAP SERPL CALC-SCNC: 14 MMOL/L
AST SERPL-CCNC: 35 U/L
BILIRUB SERPL-MCNC: 0.2 MG/DL
BUN SERPL-MCNC: 15 MG/DL
CALCIUM SERPL-MCNC: 10.3 MG/DL
CHLORIDE SERPL-SCNC: 101 MMOL/L
CHOLEST SERPL-MCNC: 112 MG/DL
CO2 SERPL-SCNC: 26 MMOL/L
CREAT SERPL-MCNC: 1.13 MG/DL
EGFR: 77 ML/MIN/1.73M2
ESTIMATED AVERAGE GLUCOSE: 140 MG/DL
GLUCOSE SERPL-MCNC: 100 MG/DL
HBA1C MFR BLD HPLC: 6.5 %
HDLC SERPL-MCNC: 30 MG/DL
LDLC SERPL CALC-MCNC: 22 MG/DL
NONHDLC SERPL-MCNC: 82 MG/DL
POTASSIUM SERPL-SCNC: 4.3 MMOL/L
PROT SERPL-MCNC: 7.3 G/DL
SODIUM SERPL-SCNC: 140 MMOL/L
TRIGL SERPL-MCNC: 297 MG/DL

## 2023-05-25 ENCOUNTER — TRANSCRIPTION ENCOUNTER (OUTPATIENT)
Age: 58
End: 2023-05-25

## 2023-07-03 ENCOUNTER — TRANSCRIPTION ENCOUNTER (OUTPATIENT)
Age: 58
End: 2023-07-03

## 2023-07-10 ENCOUNTER — TRANSCRIPTION ENCOUNTER (OUTPATIENT)
Age: 58
End: 2023-07-10

## 2023-07-11 ENCOUNTER — TRANSCRIPTION ENCOUNTER (OUTPATIENT)
Age: 58
End: 2023-07-11

## 2023-08-22 ENCOUNTER — TRANSCRIPTION ENCOUNTER (OUTPATIENT)
Age: 58
End: 2023-08-22

## 2023-09-19 ENCOUNTER — OUTPATIENT (OUTPATIENT)
Dept: OUTPATIENT SERVICES | Facility: HOSPITAL | Age: 58
LOS: 1 days | End: 2023-09-19
Payer: COMMERCIAL

## 2023-09-19 ENCOUNTER — APPOINTMENT (OUTPATIENT)
Dept: HEART AND VASCULAR | Facility: CLINIC | Age: 58
End: 2023-09-19
Payer: COMMERCIAL

## 2023-09-19 VITALS
OXYGEN SATURATION: 97 % | BODY MASS INDEX: 34.79 KG/M2 | WEIGHT: 243 LBS | HEART RATE: 66 BPM | TEMPERATURE: 98.1 F | DIASTOLIC BLOOD PRESSURE: 73 MMHG | HEIGHT: 70 IN | SYSTOLIC BLOOD PRESSURE: 116 MMHG

## 2023-09-19 DIAGNOSIS — E11.9 TYPE 2 DIABETES MELLITUS WITHOUT COMPLICATIONS: ICD-10-CM

## 2023-09-19 DIAGNOSIS — I25.10 ATHEROSCLEROTIC HEART DISEASE OF NATIVE CORONARY ARTERY WITHOUT ANGINA PECTORIS: ICD-10-CM

## 2023-09-19 DIAGNOSIS — I10 ESSENTIAL (PRIMARY) HYPERTENSION: ICD-10-CM

## 2023-09-19 DIAGNOSIS — E78.1 PURE HYPERGLYCERIDEMIA: ICD-10-CM

## 2023-09-19 DIAGNOSIS — E66.9 OBESITY, UNSPECIFIED: ICD-10-CM

## 2023-09-19 PROCEDURE — 93306 TTE W/DOPPLER COMPLETE: CPT

## 2023-09-19 PROCEDURE — 99214 OFFICE O/P EST MOD 30 MIN: CPT

## 2023-09-19 PROCEDURE — 93306 TTE W/DOPPLER COMPLETE: CPT | Mod: 26

## 2023-09-20 LAB
ALBUMIN SERPL ELPH-MCNC: 4.9 G/DL
ALP BLD-CCNC: 92 U/L
ALT SERPL-CCNC: 44 U/L
ANION GAP SERPL CALC-SCNC: 13 MMOL/L
AST SERPL-CCNC: 54 U/L
BILIRUB SERPL-MCNC: 0.3 MG/DL
BUN SERPL-MCNC: 17 MG/DL
CALCIUM SERPL-MCNC: 10.4 MG/DL
CHLORIDE SERPL-SCNC: 99 MMOL/L
CHOLEST SERPL-MCNC: 144 MG/DL
CO2 SERPL-SCNC: 28 MMOL/L
CREAT SERPL-MCNC: 1.31 MG/DL
EGFR: 64 ML/MIN/1.73M2
ESTIMATED AVERAGE GLUCOSE: 131 MG/DL
GLUCOSE SERPL-MCNC: 96 MG/DL
HBA1C MFR BLD HPLC: 6.2 %
HDLC SERPL-MCNC: 46 MG/DL
LDLC SERPL CALC-MCNC: 54 MG/DL
NONHDLC SERPL-MCNC: 97 MG/DL
POTASSIUM SERPL-SCNC: 5.3 MMOL/L
PROT SERPL-MCNC: 7.7 G/DL
SODIUM SERPL-SCNC: 140 MMOL/L
TRIGL SERPL-MCNC: 281 MG/DL

## 2023-10-16 ENCOUNTER — TRANSCRIPTION ENCOUNTER (OUTPATIENT)
Age: 58
End: 2023-10-16

## 2023-10-31 ENCOUNTER — TRANSCRIPTION ENCOUNTER (OUTPATIENT)
Age: 58
End: 2023-10-31

## 2024-01-10 ENCOUNTER — TRANSCRIPTION ENCOUNTER (OUTPATIENT)
Age: 59
End: 2024-01-10

## 2024-01-16 ENCOUNTER — TRANSCRIPTION ENCOUNTER (OUTPATIENT)
Age: 59
End: 2024-01-16

## 2024-02-13 ENCOUNTER — TRANSCRIPTION ENCOUNTER (OUTPATIENT)
Age: 59
End: 2024-02-13

## 2024-04-02 ENCOUNTER — TRANSCRIPTION ENCOUNTER (OUTPATIENT)
Age: 59
End: 2024-04-02

## 2024-04-02 RX ORDER — ALLOPURINOL 100 MG/1
100 TABLET ORAL TWICE DAILY
Qty: 180 | Refills: 3 | Status: ACTIVE | COMMUNITY
Start: 2022-10-05 | End: 1900-01-01

## 2024-05-29 NOTE — HISTORY OF PRESENT ILLNESS
[FreeTextEntry1] : 58 year old man with history of DM2, HTN, Hyperlipidemia (hypertriglyceridemia c/b pancreatitis in 2020), gout, CAD s/p PCI w/RORY to mRCA on 11/8/22, and s/p PCI of D1 on 12/16/22 is here for follow up.  5/31/24:   9/19/23: No chest pain. No shortness of breath. No dyspnea. Unlimited exercise tolerance on flat ground. No orthopnea. No PND. L arm pain is improved. Has loss of sensation of left ring and little finger after the carpal tunnel surgery.   4/18/23: Pt is s/p L carpal tunnel surgery. Feels better. No chest pain. No shortness of breath. No dyspnea. Unlimited exercise tolerance. No orthopnea. No PND.Pt says he does not eat much / not hungry. Drinks a shake during the day and then just has dinner.   1/31/2023: L shoulder pain resolved. pt feels well post PCI of D1. No chest pain. No shortness of breath. No dyspnea. Unlimited exercise tolerance. No orthopnea. No PND. Needs work documents filled out. Pt is having L hand surgery on 2/16/23 for carpal tunnel syndrome  11/29/22: Pt was here in ER with L shoulder pain and numbness which led to a cardiac CT from the ER, CAD was diagnosed and he is s/p PCI to mRCA on 11/8/22. L Shoulder and arm pain and numbness is a little better but still did not resolve. He is trying to lose weight. No shortness of breath. No dyspnea. Unlimited exercise tolerance on flat ground. No orthopnea. No PND.

## 2024-05-29 NOTE — DISCUSSION/SUMMARY
[FreeTextEntry1] : 58 year old man  with history of DM2, HTN, Hyperlipidemia (hyperttriglyceridemia c/b pancreatitis in 2020), gout, CAD s/p PCI w/RORY to mRCA on 11/8/22, and s/p PCI of D1 on 12/16/22 is here for follow up.  # CAD - stable, no cardiac complaints. Cont ASA lifelong and Plavix (for at least 1 year).Consider discontinuing one of the anti-platelet agents on next visit  # HTN - . Controlled. Cont Lisinopril 40 mg qd. Cont HCTZ to 25 mg qd. Cont Metoprolol 12.5 mg bid  # Hypertriglyceridemia - much improved and Crestor + Fenofibrate. Labs from 4/2023: , LDL 22 (but in setting of high TG) Will cont Crestor 40 qhs and Fenofibrate 134 qd Will repeat labs today and If still the same trend, will decrease Crestor and increase fenofibrate.  f/u in 6 mos [EKG obtained to assist in diagnosis and management of assessed problem(s)] : EKG obtained to assist in diagnosis and management of assessed problem(s)

## 2024-05-29 NOTE — CARDIOLOGY SUMMARY
[de-identified] : 5/31/24:  11/29/22: EKG - NSR, no ST-T changes [de-identified] : 9/19/23: Normal biventricular systolic function; no significant valvular disease 11/8/22: ECHO: Normal biventricular systolic function, EF 60-65% no sig valvular dz, PASP 23 mmHg [de-identified] : 11/7/22: Cardiac CT - Ca Score 558, severe stenosis in mRCA, other vessels non-obstructive [de-identified] : 12/16/23: s/p PCI of D1\par  11/8/22: CATH - mild LCX, LAD RPDA dz. ostial D1 80% stenosis, mRCA 80% stenosis, s/p PCI

## 2024-05-31 ENCOUNTER — APPOINTMENT (OUTPATIENT)
Dept: HEART AND VASCULAR | Facility: CLINIC | Age: 59
End: 2024-05-31
Payer: COMMERCIAL

## 2024-05-31 DIAGNOSIS — E78.1 PURE HYPERGLYCERIDEMIA: ICD-10-CM

## 2024-05-31 DIAGNOSIS — G56.00 CARPAL TUNNEL SYNDROME, UNSPECIFIED UPPER LIMB: ICD-10-CM

## 2024-05-31 DIAGNOSIS — I25.10 ATHEROSCLEROTIC HEART DISEASE OF NATIVE CORONARY ARTERY W/OUT ANGINA PECTORIS: ICD-10-CM

## 2024-05-31 DIAGNOSIS — E11.9 TYPE 2 DIABETES MELLITUS W/OUT COMPLICATIONS: ICD-10-CM

## 2024-05-31 DIAGNOSIS — I10 ESSENTIAL (PRIMARY) HYPERTENSION: ICD-10-CM

## 2024-05-31 PROCEDURE — 99214 OFFICE O/P EST MOD 30 MIN: CPT

## 2024-05-31 PROCEDURE — 93000 ELECTROCARDIOGRAM COMPLETE: CPT

## 2024-06-05 PROBLEM — S93.502A SPRAIN OF LEFT GREAT TOE: Status: ACTIVE | Noted: 2017-02-01

## 2024-06-07 ENCOUNTER — NON-APPOINTMENT (OUTPATIENT)
Age: 59
End: 2024-06-07

## 2024-06-07 ENCOUNTER — APPOINTMENT (OUTPATIENT)
Dept: HEART AND VASCULAR | Facility: CLINIC | Age: 59
End: 2024-06-07
Payer: COMMERCIAL

## 2024-06-07 VITALS
DIASTOLIC BLOOD PRESSURE: 75 MMHG | TEMPERATURE: 98.6 F | SYSTOLIC BLOOD PRESSURE: 129 MMHG | HEIGHT: 70 IN | HEART RATE: 83 BPM | BODY MASS INDEX: 35.07 KG/M2 | WEIGHT: 245 LBS | RESPIRATION RATE: 16 BRPM | OXYGEN SATURATION: 98 %

## 2024-06-07 DIAGNOSIS — S93.502A UNSPECIFIED SPRAIN OF LEFT GREAT TOE, INITIAL ENCOUNTER: ICD-10-CM

## 2024-06-07 DIAGNOSIS — Z00.00 ENCOUNTER FOR GENERAL ADULT MEDICAL EXAMINATION W/OUT ABNORMAL FINDINGS: ICD-10-CM

## 2024-06-07 PROBLEM — I25.10 CORONARY ARTERY DISEASE: Status: ACTIVE | Noted: 2022-11-29

## 2024-06-07 PROBLEM — G56.00 CARPAL TUNNEL SYNDROME: Status: ACTIVE | Noted: 2023-04-18

## 2024-06-07 PROBLEM — I10 ESSENTIAL HYPERTENSION: Status: ACTIVE | Noted: 2022-11-29

## 2024-06-07 PROBLEM — E11.9 TYPE 2 DIABETES MELLITUS: Status: ACTIVE | Noted: 2022-11-29

## 2024-06-07 PROBLEM — E78.1 HYPERTRIGLYCERIDEMIA: Status: ACTIVE | Noted: 2022-11-29

## 2024-06-07 PROCEDURE — 93000 ELECTROCARDIOGRAM COMPLETE: CPT

## 2024-06-07 PROCEDURE — 99214 OFFICE O/P EST MOD 30 MIN: CPT

## 2024-06-07 RX ORDER — LISINOPRIL 40 MG/1
40 TABLET ORAL DAILY
Qty: 90 | Refills: 3 | Status: ACTIVE | COMMUNITY
Start: 2022-11-29 | End: 1900-01-01

## 2024-06-07 RX ORDER — EMPAGLIFLOZIN 10 MG/1
10 TABLET, FILM COATED ORAL
Qty: 90 | Refills: 3 | Status: ACTIVE | COMMUNITY
Start: 2022-10-05 | End: 1900-01-01

## 2024-06-07 RX ORDER — FENOFIBRATE 134 MG/1
134 CAPSULE ORAL
Qty: 90 | Refills: 3 | Status: ACTIVE | COMMUNITY
Start: 2022-10-05 | End: 1900-01-01

## 2024-06-07 RX ORDER — ROSUVASTATIN CALCIUM 40 MG/1
40 TABLET, FILM COATED ORAL
Qty: 90 | Refills: 3 | Status: ACTIVE | COMMUNITY
Start: 2022-11-29 | End: 1900-01-01

## 2024-06-07 RX ORDER — HYDROCHLOROTHIAZIDE 25 MG/1
25 TABLET ORAL DAILY
Qty: 90 | Refills: 3 | Status: ACTIVE | COMMUNITY
Start: 2022-11-29 | End: 1900-01-01

## 2024-06-07 RX ORDER — METOPROLOL SUCCINATE 25 MG/1
25 TABLET, EXTENDED RELEASE ORAL
Qty: 30 | Refills: 2 | Status: ACTIVE | COMMUNITY
Start: 2024-06-07 | End: 1900-01-01

## 2024-06-07 RX ORDER — METOPROLOL TARTRATE 25 MG/1
25 TABLET, FILM COATED ORAL TWICE DAILY
Qty: 90 | Refills: 3 | Status: DISCONTINUED | COMMUNITY
Start: 2022-11-29 | End: 2024-06-07

## 2024-06-07 RX ORDER — METFORMIN HYDROCHLORIDE 1000 MG/1
1000 TABLET, COATED ORAL TWICE DAILY
Qty: 180 | Refills: 3 | Status: ACTIVE | COMMUNITY
Start: 2022-10-05 | End: 1900-01-01

## 2024-06-07 RX ORDER — CLOPIDOGREL BISULFATE 75 MG/1
75 TABLET, FILM COATED ORAL DAILY
Qty: 90 | Refills: 3 | Status: ACTIVE | COMMUNITY
Start: 2022-11-09 | End: 1900-01-01

## 2024-06-07 NOTE — HISTORY OF PRESENT ILLNESS
[FreeTextEntry1] : 58 year old man with history of DM2, HTN, Hyperlipidemia (hypertriglyceridemia c/b pancreatitis in 2020), gout, CAD s/p PCI w/RORY to mRCA on 11/8/22, and s/p PCI of D1 on 12/16/22 is here for follow up.  6/7/24: No CP, SOB, BLAKE, orthopnea, leg swelling. Arm pain resolved after carpal tunnel surgery. He has unlimited exercise tolerance. His main complaint today is fatigue for the past 1 year. He reports his PCP increased his metoprolol from 12.5mg BID to succinate 50mg QD, but when he picked it up from his pharmacy, he was given tartrate 25mg - he has been taking tartrate 50mg QD. His PCP also asked him to increase fenofibrate to 200mg QD but he has continued at the current dose (134mg). He does not check BP at home. Due to lack of appetite, reports he only eats 1 meal a day on average.   5/31/24: no show  9/19/23: No chest pain. No shortness of breath. No dyspnea. Unlimited exercise tolerance on flat ground. No orthopnea. No PND. L arm pain is improved. Has loss of sensation of left ring and little finger after the carpal tunnel surgery.   4/18/23: Pt is s/p L carpal tunnel surgery. Feels better. No chest pain. No shortness of breath. No dyspnea. Unlimited exercise tolerance. No orthopnea. No PND.Pt says he does not eat much / not hungry. Drinks a shake during the day and then just has dinner.   1/31/2023: L shoulder pain resolved. pt feels well post PCI of D1. No chest pain. No shortness of breath. No dyspnea. Unlimited exercise tolerance. No orthopnea. No PND. Needs work documents filled out. Pt is having L hand surgery on 2/16/23 for carpal tunnel syndrome  11/29/22: Pt was here in ER with L shoulder pain and numbness which led to a cardiac CT from the ER, CAD was diagnosed and he is s/p PCI to mRCA on 11/8/22. L Shoulder and arm pain and numbness is a little better but still did not resolve. He is trying to lose weight. No shortness of breath. No dyspnea. Unlimited exercise tolerance on flat ground. No orthopnea. No PND.

## 2024-06-07 NOTE — DISCUSSION/SUMMARY
[EKG obtained to assist in diagnosis and management of assessed problem(s)] : EKG obtained to assist in diagnosis and management of assessed problem(s) [FreeTextEntry1] : 58 year old man  with history of DM2, HTN, Hyperlipidemia (hyperttriglyceridemia c/b pancreatitis in 2020), gout, CAD s/p PCI w/RORY to mRCA on 11/8/22, and s/p PCI of D1 on 12/16/22 is here for follow up.  # CAD - stable, no cardiac complaints. Stop aspirin today since it's been more than a year since the last PCI. Continue plavix.   # HTN - Controlled. Cont Lisinopril 40 mg qd. Cont HCTZ to 25 mg qd. Metoprolol could be implicated in fatigue. Will reduce to tartrate 12.5mg BID x 3 days then switch to Toprol 12.5mg QD  # Fatigue: Reduce metoprolol as above. Send TSH and CBC. Will task Yara Velez for dietary counseling as patient admits to eating only once per day w/lack of appetite.   # Hypertriglyceridemia - much improved. Cont Crestor 40 qhs and Fenofibrate 134 qd Check all labs today incl lipids, A1c, CBC, BMP, TSH  f/u in 6 mo. will call patient to go over lab results

## 2024-06-07 NOTE — CARDIOLOGY SUMMARY
[de-identified] : 6/7/24:  NSR, nonspecific T wave changes 11/29/22: EKG - NSR, no ST-T changes [de-identified] : 9/19/23: Normal biventricular systolic function; no significant valvular disease 11/8/22: ECHO: Normal biventricular systolic function, EF 60-65% no sig valvular dz, PASP 23 mmHg [de-identified] : 11/7/22: Cardiac CT - Ca Score 558, severe stenosis in mRCA, other vessels non-obstructive [de-identified] : 12/16/23: s/p PCI of D1\par  11/8/22: CATH - mild LCX, LAD RPDA dz. ostial D1 80% stenosis, mRCA 80% stenosis, s/p PCI

## 2024-06-11 ENCOUNTER — LABORATORY RESULT (OUTPATIENT)
Age: 59
End: 2024-06-11

## 2024-07-25 ENCOUNTER — TRANSCRIPTION ENCOUNTER (OUTPATIENT)
Age: 59
End: 2024-07-25

## 2024-08-07 ENCOUNTER — TRANSCRIPTION ENCOUNTER (OUTPATIENT)
Age: 59
End: 2024-08-07

## 2024-10-22 NOTE — ED ADULT NURSE NOTE - NS ED NURSE LEVEL OF CONSCIOUSNESS ORIENTATION
Chief Complaint   Patient presents with    Cough       Video Visit Reason:   Free Text Description: Cough- persistent- lots of mucus.  Zero congestion.  Subjective   Rosa Alvarez is a 44 y.o. female.     History of Present Illness  Cough, chest tightness with occasional clear sputum production but mostly dry cough. Cough is almost constant and kept her up over the last few nights. She felt poorly on Thursday of last week, cough started on Friday and has worsened over the last 4 days.  Cough  This is a new problem. The current episode started in the past 7 days. The problem has been worse. The problem occurs every few minutes. The cough is Productive of sputum. Pertinent negatives include no chills, ear pain, fever, headaches, nasal congestion, postnasal drip, rhinorrhea, sore throat, shortness of breath or wheezing. The symptoms are aggravated by lying down. She has tried OTC cough suppressant for the symptoms. The treatment provided no relief. There is no history of asthma or environmental allergies.       The following portions of the patient's history were reviewed and updated as appropriate: allergies, current medications, past medical history, and problem list.      Past Medical History:   Diagnosis Date    Hypothyroidism     Peptic ulcer disease      Social History     Socioeconomic History    Marital status:    Tobacco Use    Smoking status: Never     Passive exposure: Never    Smokeless tobacco: Never   Vaping Use    Vaping status: Never Used   Substance and Sexual Activity    Alcohol use: Not Currently     Comment: 1-2 times week    Drug use: Never    Sexual activity: Yes     Partners: Male     Birth control/protection: Vasectomy     medication documentation: reviewed and updated with patient and   Current Outpatient Medications:     benzonatate (Tessalon Perles) 100 MG capsule, Take 1 capsule by mouth 3 (Three) Times a Day As Needed for Cough., Disp: 30 capsule, Rfl: 0    Cholecalciferol  (VITAMIN D3 PO), , Disp: , Rfl:     methylPREDNISolone (MEDROL) 4 MG dose pack, Take as directed on package instructions., Disp: 1 each, Rfl: 0    multivitamin (THERAGRAN) tablet tablet, , Disp: , Rfl:     promethazine-dextromethorphan (PROMETHAZINE-DM) 6.25-15 MG/5ML syrup, Take 5 mL by mouth 4 (Four) Times a Day As Needed for Cough for up to 10 days., Disp: 120 mL, Rfl: 0    Thyroid (ARMOUR THYROID) 120 MG PO tablet, Take 1 tablet by mouth Daily., Disp: 90 tablet, Rfl: 3    thyroid (E[place]orado Thyroid) 15 MG tablet, Take 1 tablet by mouth Daily., Disp: 90 tablet, Rfl: 3  Review of Systems   Constitutional:  Positive for fatigue. Negative for chills and fever.   HENT:  Negative for congestion, ear pain, postnasal drip, rhinorrhea, sinus pressure, sinus pain, sneezing, sore throat, trouble swallowing and voice change.    Respiratory:  Positive for cough and chest tightness. Negative for shortness of breath and wheezing.    Allergic/Immunologic: Negative for environmental allergies.   Neurological:  Negative for dizziness and headaches.       Objective   Physical Exam  Constitutional:       General: She is not in acute distress.     Appearance: She is not ill-appearing.   HENT:      Nose: No congestion.      Mouth/Throat:      Pharynx: Oropharynx is clear.   Eyes:      Conjunctiva/sclera: Conjunctivae normal.   Pulmonary:      Effort: Pulmonary effort is normal.      Comments: Frequent dry cough  Neurological:      Mental Status: She is alert.   Psychiatric:         Mood and Affect: Mood normal.           Assessment & Plan   Diagnoses and all orders for this visit:    1. Acute bronchitis, unspecified organism (Primary)  -     methylPREDNISolone (MEDROL) 4 MG dose pack; Take as directed on package instructions.  Dispense: 1 each; Refill: 0  -     benzonatate (Tessalon Perles) 100 MG capsule; Take 1 capsule by mouth 3 (Three) Times a Day As Needed for Cough.  Dispense: 30 capsule; Refill: 0  -      promethazine-dextromethorphan (PROMETHAZINE-DM) 6.25-15 MG/5ML syrup; Take 5 mL by mouth 4 (Four) Times a Day As Needed for Cough for up to 10 days.  Dispense: 120 mL; Refill: 0                    Follow Up:  If your symptoms are not resolving by the completion of your treatment or are worsening, see your primary care provider for follow up. If you don't have a primary care provider, you may go to any Urgent Care for re-evaluation. If you develop any life threatening symptoms, go to the nearest Emergency Department immediately or call EMS.               The use of  Video Visit was utilized during this visit, using both [employer] and [employer]. The use of a video visit has been reviewed with the patient and verbal informed consent has been obtained. No technical difficulties occurred during the visit.    is located at [address] [place] [id number]  Provider is located at [place], [place]                   Oriented - self; Oriented - place; Oriented - time

## 2024-10-24 ENCOUNTER — TRANSCRIPTION ENCOUNTER (OUTPATIENT)
Age: 59
End: 2024-10-24

## 2024-12-06 ENCOUNTER — APPOINTMENT (OUTPATIENT)
Dept: HEART AND VASCULAR | Facility: CLINIC | Age: 59
End: 2024-12-06

## 2024-12-13 ENCOUNTER — APPOINTMENT (OUTPATIENT)
Dept: HEART AND VASCULAR | Facility: CLINIC | Age: 59
End: 2024-12-13

## 2024-12-13 DIAGNOSIS — E11.9 TYPE 2 DIABETES MELLITUS W/OUT COMPLICATIONS: ICD-10-CM

## 2024-12-13 DIAGNOSIS — E66.9 OBESITY, UNSPECIFIED: ICD-10-CM

## 2024-12-13 DIAGNOSIS — I10 ESSENTIAL (PRIMARY) HYPERTENSION: ICD-10-CM

## 2024-12-13 DIAGNOSIS — E78.1 PURE HYPERGLYCERIDEMIA: ICD-10-CM

## 2024-12-13 DIAGNOSIS — I25.10 ATHEROSCLEROTIC HEART DISEASE OF NATIVE CORONARY ARTERY W/OUT ANGINA PECTORIS: ICD-10-CM

## 2025-05-23 ENCOUNTER — NON-APPOINTMENT (OUTPATIENT)
Age: 60
End: 2025-05-23

## 2025-05-23 ENCOUNTER — APPOINTMENT (OUTPATIENT)
Dept: HEART AND VASCULAR | Facility: CLINIC | Age: 60
End: 2025-05-23
Payer: COMMERCIAL

## 2025-05-23 VITALS
DIASTOLIC BLOOD PRESSURE: 78 MMHG | TEMPERATURE: 95 F | SYSTOLIC BLOOD PRESSURE: 133 MMHG | OXYGEN SATURATION: 97 % | HEIGHT: 70 IN | HEART RATE: 74 BPM | WEIGHT: 247 LBS | BODY MASS INDEX: 35.36 KG/M2

## 2025-05-23 DIAGNOSIS — E66.9 OBESITY, UNSPECIFIED: ICD-10-CM

## 2025-05-23 DIAGNOSIS — E78.1 PURE HYPERGLYCERIDEMIA: ICD-10-CM

## 2025-05-23 DIAGNOSIS — E11.9 TYPE 2 DIABETES MELLITUS W/OUT COMPLICATIONS: ICD-10-CM

## 2025-05-23 DIAGNOSIS — I10 ESSENTIAL (PRIMARY) HYPERTENSION: ICD-10-CM

## 2025-05-23 DIAGNOSIS — E11.59 TYPE 2 DIABETES MELLITUS WITH OTHER CIRCULATORY COMPLICATIONS: ICD-10-CM

## 2025-05-23 DIAGNOSIS — I25.10 ATHEROSCLEROTIC HEART DISEASE OF NATIVE CORONARY ARTERY W/OUT ANGINA PECTORIS: ICD-10-CM

## 2025-05-23 PROCEDURE — 93000 ELECTROCARDIOGRAM COMPLETE: CPT | Mod: 1L

## 2025-05-23 PROCEDURE — 99214 OFFICE O/P EST MOD 30 MIN: CPT | Mod: 1L

## 2025-05-23 RX ORDER — SEMAGLUTIDE 0.68 MG/ML
2 INJECTION, SOLUTION SUBCUTANEOUS
Qty: 4 | Refills: 2 | Status: ACTIVE | COMMUNITY
Start: 2025-05-23 | End: 1900-01-01

## 2025-05-28 ENCOUNTER — TRANSCRIPTION ENCOUNTER (OUTPATIENT)
Age: 60
End: 2025-05-28

## 2025-05-28 ENCOUNTER — RX RENEWAL (OUTPATIENT)
Age: 60
End: 2025-05-28

## 2025-05-28 LAB
25(OH)D3 SERPL-MCNC: 39.2 NG/ML
ALBUMIN SERPL ELPH-MCNC: 4.9 G/DL
ALP BLD-CCNC: 132 U/L
ALT SERPL-CCNC: 49 U/L
ANION GAP SERPL CALC-SCNC: 20 MMOL/L
APO B SERPL-MCNC: 64 MG/DL
APO LP(A) SERPL-MCNC: 42.9 NMOL/L
AST SERPL-CCNC: 50 U/L
BILIRUB SERPL-MCNC: 0.2 MG/DL
BUN SERPL-MCNC: 18 MG/DL
CALCIUM SERPL-MCNC: 10.4 MG/DL
CHLORIDE SERPL-SCNC: 98 MMOL/L
CHOLEST SERPL-MCNC: 116 MG/DL
CO2 SERPL-SCNC: 22 MMOL/L
CREAT SERPL-MCNC: 1.14 MG/DL
CRP SERPL HS-MCNC: 2.49 MG/L
EGFRCR SERPLBLD CKD-EPI 2021: 75 ML/MIN/1.73M2
ESTIMATED AVERAGE GLUCOSE: 146 MG/DL
GLUCOSE SERPL-MCNC: 113 MG/DL
HBA1C MFR BLD HPLC: 6.7 %
HCT VFR BLD CALC: 44.3 %
HDLC SERPL-MCNC: 39 MG/DL
HGB BLD-MCNC: 15 G/DL
LDLC SERPL-MCNC: 38 MG/DL
MAGNESIUM SERPL-MCNC: 1.8 MG/DL
MCHC RBC-ENTMCNC: 29.4 PG
MCHC RBC-ENTMCNC: 33.9 G/DL
MCV RBC AUTO: 86.7 FL
NONHDLC SERPL-MCNC: 77 MG/DL
PLATELET # BLD AUTO: 244 K/UL
POTASSIUM SERPL-SCNC: 4.9 MMOL/L
PROT SERPL-MCNC: 7.5 G/DL
RBC # BLD: 5.11 M/UL
RBC # FLD: 15.3 %
SODIUM SERPL-SCNC: 139 MMOL/L
TRIGL SERPL-MCNC: 257 MG/DL
TSH SERPL-ACNC: 1.39 UIU/ML
WBC # FLD AUTO: 3.99 K/UL

## 2025-07-14 ENCOUNTER — TRANSCRIPTION ENCOUNTER (OUTPATIENT)
Age: 60
End: 2025-07-14

## 2025-08-20 ENCOUNTER — TRANSCRIPTION ENCOUNTER (OUTPATIENT)
Age: 60
End: 2025-08-20

## 2025-09-03 ENCOUNTER — TRANSCRIPTION ENCOUNTER (OUTPATIENT)
Age: 60
End: 2025-09-03